# Patient Record
Sex: FEMALE | Race: BLACK OR AFRICAN AMERICAN | NOT HISPANIC OR LATINO | Employment: FULL TIME | URBAN - METROPOLITAN AREA
[De-identification: names, ages, dates, MRNs, and addresses within clinical notes are randomized per-mention and may not be internally consistent; named-entity substitution may affect disease eponyms.]

---

## 2017-04-26 ENCOUNTER — OFFICE VISIT (OUTPATIENT)
Dept: MEDICAL GROUP | Facility: PHYSICIAN GROUP | Age: 27
End: 2017-04-26
Payer: COMMERCIAL

## 2017-04-26 VITALS
OXYGEN SATURATION: 98 % | SYSTOLIC BLOOD PRESSURE: 110 MMHG | HEART RATE: 96 BPM | DIASTOLIC BLOOD PRESSURE: 60 MMHG | WEIGHT: 225.8 LBS | TEMPERATURE: 98.6 F | RESPIRATION RATE: 16 BRPM | BODY MASS INDEX: 35.44 KG/M2 | HEIGHT: 67 IN

## 2017-04-26 DIAGNOSIS — R53.81 CHRONIC FATIGUE AND MALAISE: ICD-10-CM

## 2017-04-26 DIAGNOSIS — J45.20 MILD INTERMITTENT ASTHMA WITHOUT COMPLICATION: ICD-10-CM

## 2017-04-26 DIAGNOSIS — R53.82 CHRONIC FATIGUE AND MALAISE: ICD-10-CM

## 2017-04-26 PROCEDURE — 99214 OFFICE O/P EST MOD 30 MIN: CPT | Performed by: FAMILY MEDICINE

## 2017-04-26 RX ORDER — ALBUTEROL SULFATE 90 UG/1
2 AEROSOL, METERED RESPIRATORY (INHALATION) EVERY 6 HOURS PRN
Qty: 8.5 G | Refills: 3 | Status: SHIPPED
Start: 2017-04-26 | End: 2019-12-29

## 2017-04-26 ASSESSMENT — ENCOUNTER SYMPTOMS
PALPITATIONS: 0
EYES NEGATIVE: 1
HEMOPTYSIS: 0
PSYCHIATRIC NEGATIVE: 1
CHILLS: 0
MYALGIAS: 0
CONSTIPATION: 0
NECK PAIN: 0
CARDIOVASCULAR NEGATIVE: 1
MUSCULOSKELETAL NEGATIVE: 1
GASTROINTESTINAL NEGATIVE: 1
HEADACHES: 0
CHEST TIGHTNESS: 1
COUGH: 1
NEUROLOGICAL NEGATIVE: 1
FEVER: 0
DIZZINESS: 0
FATIGUE: 1

## 2017-04-26 ASSESSMENT — PATIENT HEALTH QUESTIONNAIRE - PHQ9: CLINICAL INTERPRETATION OF PHQ2 SCORE: 2

## 2017-04-26 NOTE — PROGRESS NOTES
Subjective:      Joy Isbell is a 26 y.o. female who presents with Asthma; Fatigue; and Orders Needed            HPI Comments: Well controlled asthma but new issue of fatigue    1. Mild intermittent asthma without complication    - Mometasone Furo-Formoterol Fum (DULERA) 200-5 MCG/ACT Aerosol; Inhale 1 Puff by mouth 2 Times a Day.  Dispense: 1 Inhaler; Refill: 6  - albuterol 108 (90 BASE) MCG/ACT Aero Soln inhalation aerosol; Inhale 2 Puffs by mouth every 6 hours as needed for Shortness of Breath.  Dispense: 8.5 g; Refill: 3    Past Medical History:    Asthma                                                      History reviewed. No pertinent surgical history.    Smoking Status: Current Every Day Smoker        Packs/Day: 0.25  Years: 1.5        Types: Cigarettes    Smokeless Status: Never Used                        Alcohol Use: Yes                Comment: 3-4 beers on the weekends occasionly                 drinks wine    History reviewed.  No pertinent family history.      Current outpatient prescriptions: •  Mometasone Furo-Formoterol Fum (DULERA) 200-5 MCG/ACT Aerosol, Inhale 1 Puff by mouth 2 Times a Day., Disp: 1 Inhaler, Rfl: 6•  albuterol 108 (90 BASE) MCG/ACT Aero Soln inhalation aerosol, Inhale 2 Puffs by mouth every 6 hours as needed for Shortness of Breath., Disp: 8.5 g, Rfl: 3•  ipratropium-albuterol (COMBIVENT RESPIMAT)  MCG/ACT AERS, Inhale 1 Puff by mouth 4 times a day., Disp: 1 Inhaler, Rfl: 5•  fluticasone (FLONASE) 50 MCG/ACT nasal spray, Spray 1 Spray in nose every day., Disp: 16 g, Rfl: 03•  loratadine/pseudo SR (CLARITIN D) 5-120 MG TB12, Take 1 Tab by mouth 2 times a day., Disp: 60 Tab, Rfl: 3        Asthma  She complains of chest tightness and cough. There is no hemoptysis. This is a chronic problem. The current episode started more than 1 year ago. The problem occurs intermittently. The problem has been waxing and waning. Associated symptoms include malaise/fatigue. Pertinent  "negatives include no chest pain, fever, headaches or myalgias. Her past medical history is significant for asthma.   Fatigue  This is a chronic problem. The current episode started more than 1 month ago. The problem occurs constantly. The problem has been unchanged. Associated symptoms include coughing and fatigue. Pertinent negatives include no chest pain, chills, fever, headaches, myalgias, neck pain or rash. Associated symptoms comments: Hair loss. The symptoms are aggravated by exertion. She has tried rest for the symptoms. The treatment provided mild relief.       Review of Systems   Constitutional: Positive for malaise/fatigue and fatigue. Negative for fever and chills.        Past Medical History:    Asthma                                                      History reviewed. No pertinent surgical history.    Smoking Status: Current Every Day Smoker        Packs/Day: 0.25  Years: 1.5       Types: Cigarettes    Smokeless Status: Never Used                        Alcohol Use: Yes                Comment: 3-4 beers on the weekends occasionly                 drinks wine    History reviewed.  No pertinent family history.     HENT: Negative.    Eyes: Negative.    Respiratory: Positive for cough. Negative for hemoptysis.    Cardiovascular: Negative.  Negative for chest pain and palpitations.   Gastrointestinal: Negative.  Negative for constipation.   Genitourinary: Negative.  Negative for dysuria and urgency.   Musculoskeletal: Negative.  Negative for myalgias and neck pain.   Skin: Negative.  Negative for rash.   Neurological: Negative.  Negative for dizziness and headaches.   Endo/Heme/Allergies: Negative.    Psychiatric/Behavioral: Negative.  Negative for suicidal ideas.          Objective:     /60 mmHg  Pulse 96  Temp(Src) 37 °C (98.6 °F)  Resp 16  Ht 1.689 m (5' 6.5\")  Wt 102.422 kg (225 lb 12.8 oz)  BMI 35.90 kg/m2  SpO2 98%     Physical Exam   Constitutional: She is oriented to person, place, and " time. No distress.   HENT:   Head: Normocephalic and atraumatic.   Right Ear: External ear normal.   Left Ear: External ear normal.   Nose: Nose normal.   Mouth/Throat: Oropharynx is clear and moist. No oropharyngeal exudate.   Eyes: Pupils are equal, round, and reactive to light. Right eye exhibits no discharge. Left eye exhibits no discharge. No scleral icterus.   Neck: Normal range of motion. Neck supple. No JVD present. No tracheal deviation present. No thyromegaly present.   Cardiovascular: Normal rate, regular rhythm, normal heart sounds and intact distal pulses.  Exam reveals no gallop and no friction rub.    No murmur heard.  Pulmonary/Chest: Effort normal and breath sounds normal. No stridor. No respiratory distress. She has no wheezes. She has no rales. She exhibits no tenderness.   Abdominal: Soft. She exhibits no distension. There is no tenderness.   Lymphadenopathy:     She has no cervical adenopathy.   Neurological: She is alert and oriented to person, place, and time.   Skin: Skin is warm and dry. She is not diaphoretic.   Psychiatric: Judgment normal.   Nursing note and vitals reviewed.              Assessment/Plan:     1. Mild intermittent asthma without complication    - Mometasone Furo-Formoterol Fum (DULERA) 200-5 MCG/ACT Aerosol; Inhale 1 Puff by mouth 2 Times a Day.  Dispense: 1 Inhaler; Refill: 6  - albuterol 108 (90 BASE) MCG/ACT Aero Soln inhalation aerosol; Inhale 2 Puffs by mouth every 6 hours as needed for Shortness of Breath.  Dispense: 8.5 g; Refill: 3    2. Fatigue will get labs

## 2017-04-26 NOTE — MR AVS SNAPSHOT
"Joy Isbell   2017 8:00 AM   Office Visit   MRN: [id number]    Department:  [facility]    Dept Phone:  [phone]    Description:  Female : 1990   Provider:  Trenton Costa M.D.           Reason for Visit     Asthma     Fatigue     Orders Needed labs      Allergies as of 2017     No Known Allergies      You were diagnosed with     Mild intermittent asthma without complication   [009044]       Chronic fatigue and malaise   [043633]         Vital Signs     Blood Pressure Pulse Temperature Respirations Height Weight    110/60 mmHg 96 37 °C (98.6 °F) 16 1.689 m (5' 6.5\") 102.422 kg (225 lb 12.8 oz)    Body Mass Index Oxygen Saturation Smoking Status             35.90 kg/m2 98% Current Every Day Smoker         Basic Information     Date Of Birth Sex Race Ethnicity Preferred Language    1990 Female Black or  Non- English      Problem List              ICD-10-CM Priority Class Noted - Resolved    Mild intermittent asthma without complication J45.20   2017 - Present      Health Maintenance        Date Due Completion Dates    IMM VARICELLA (CHICKENPOX) VACCINE (2 of 2 - 2 Dose Childhood Series) 1998    IMM HPV VACCINE (1 of 3 - Female 3 Dose Series) 9/15/2001 ---    IMM DTaP/Tdap/Td Vaccine (6 - Tdap) 9/15/2001 1994, 1992, 3/19/1991, 1991, 1990    PAP SMEAR 9/15/2011 ---            Current Immunizations     Dtap Vaccine 1994, 1992, 3/19/1991, 1991, 1990    HIB Vaccine (ACTHIB/HIBERIX) 1992, 1991, 3/19/1991, 1991    Hepatitis A Vaccine, Ped/Adol 2008, 2003    Hepatitis B Vaccine Non-Recombivax (Ped/Adol) 2/3/2004, 2003, 2003    IPV 1992, 3/19/1991, 1991, 1990    MMR Vaccine 1994, 1/15/1992    Varicella Vaccine Live 1998      Below and/or attached are the medications your provider expects you to take. Review all of your home medications and newly " ordered medications with your provider and/or pharmacist. Follow medication instructions as directed by your provider and/or pharmacist. Please keep your medication list with you and share with your provider. Update the information when medications are discontinued, doses are changed, or new medications (including over-the-counter products) are added; and carry medication information at all times in the event of emergency situations     Allergies:  No Known Allergies          Medications  Valid as of: April 26, 2017 -  8:30 AM    Generic Name Brand Name Tablet Size Instructions for use    Albuterol Sulfate (Aero Soln) albuterol 108 (90 BASE) MCG/ACT Inhale 2 Puffs by mouth every 6 hours as needed for Shortness of Breath.        Fluticasone Propionate (Suspension) FLONASE 50 MCG/ACT Spray 1 Spray in nose every day.        Ipratropium-Albuterol (Aero Soln) COMBIVENT RESPIMAT  MCG/ACT Inhale 1 Puff by mouth 4 times a day.        Loratadine-Pseudoephedrine (TABLET SR 12 HR) CLARITIN D 5-120 MG Take 1 Tab by mouth 2 times a day.        Mometasone Furo-Formoterol Fum (Aerosol) Mometasone Furo-Formoterol Fum 200-5 MCG/ACT Inhale 1 Puff by mouth 2 Times a Day.        .                 Medicines prescribed today were sent to:     [facility] [address] [place] [address] (N), [address] (N) [place] [address]    Phone: [phone] Fax: [phone]    Open 24 Hours?: No      Medication refill instructions:       If your prescription bottle indicates you have medication refills left, it is not necessary to call your provider’s office. Please contact your pharmacy and they will refill your medication.    If your prescription bottle indicates you do not have any refills left, you may request refills at any time through one of the following ways: The online [employer] system (except Urgent Care), by calling your provider’s office, or by asking your pharmacy to contact your provider’s office  with a refill request. Medication refills are processed only during regular business hours and may not be available until the next business day. Your provider may request additional information or to have a follow-up visit with you prior to refilling your medication.   *Please Note: Medication refills are assigned a new Rx number when refilled electronically. Your pharmacy may indicate that no refills were authorized even though a new prescription for the same medication is available at the pharmacy. Please request the medicine by name with the pharmacy before contacting your provider for a refill.        Your To Do List     Future Labs/Procedures Complete By Expires    CBC WITHOUT DIFFERENTIAL  As directed 10/27/2017    COMP METABOLIC PANEL  As directed 4/26/2018    FREE THYROXINE  As directed 4/26/2018    IRON/TOTAL IRON BIND  As directed 10/27/2017    TRIIDOTHYRONINE  As directed 4/26/2018    TSH  As directed 4/26/2018    VITAMIN B12  As directed 10/26/2017    VITAMIN D,25 HYDROXY  As directed 4/26/2018         Gazemetrix Access Code: [email or web address]  Expires: 5/26/2017  8:30 AM    Gazemetrix  A secure, online tool to manage your health information     [employer]’s Gazemetrix® is a secure, online tool that connects you to your personalized health information from the privacy of your home -- day or night - making it very easy for you to manage your healthcare. Once the activation process is completed, you can even access your medical information using the Gazemetrix ronda, which is available for free in the Apple Ronda store or Google Play store.     Gazemetrix provides the following levels of access (as shown below):   My Chart Features   [facility] Doctor [facility]  Specialists [facility]  Urgent  Care [facility]  Primary Care  Doctor   Email your healthcare team securely and privately 24/7 X X X    Manage appointments: schedule your next appointment; view details of past/upcoming appointments X      Request prescription  refills. X      View recent personal medical records, including lab and immunizations X X X X   View health record, including health history, allergies, medications X X X X   Read reports about your outpatient visits, procedures, consult and ER notes X X X X   See your discharge summary, which is a recap of your hospital and/or ER visit that includes your diagnosis, lab results, and care plan. X X       How to register for InflowControl:  1. Go to  [email or web address].  2. Click on the Sign Up Now box, which takes you to the New Member Sign Up page. You will need to provide the following information:  a. Enter your InflowControl Access Code exactly as it appears at the top of this page. (You will not need to use this code after you’ve completed the sign-up process. If you do not sign up before the expiration date, you must request a new code.)   b. Enter your date of birth.   c. Enter your home email address.   d. Click Submit, and follow the next screen’s instructions.  3. Create a InflowControl ID. This will be your InflowControl login ID and cannot be changed, so think of one that is secure and easy to remember.  4. Create a InflowControl password. You can change your password at any time.  5. Enter your Password Reset Question and Answer. This can be used at a later time if you forget your password.   6. Enter your e-mail address. This allows you to receive e-mail notifications when new information is available in InflowControl.  7. Click Sign Up. You can now view your health information.    For assistance activating your InflowControl account, call [phone]        Quit Tobacco Information     Do you want to quit using tobacco?    Quitting tobacco decreases risks of cancer, heart and lung disease, increases life expectancy, improves sense of taste and smell, and increases spending money, among other benefits.    If you are thinking about quitting, we can help.  • [facility] Quit Tobacco Program: [phone]  o Program occurs weekly for four  weeks and includes pharmacist consultation on products to support quitting smoking or chewing tobacco. A provider referral is needed for pharmacist consultation.  • Tobacco Users Help Hotline: 5-032-QUIT-NOW (938-7479) or https://[place].quitlogix.org/  o Free, confidential telephone and online coaching for [place] residents. Sessions are designed on a schedule that is convenient for you. Eligible clients receive free nicotine replacement therapy.  • Nationally: www.smokefree.gov  o Information and professional assistance to support both immediate and long-term needs as you become, and remain, a non-smoker. Smokefree.gov allows you to choose the help that best fits your needs.

## 2017-04-27 ENCOUNTER — HOSPITAL ENCOUNTER (OUTPATIENT)
Dept: LAB | Facility: MEDICAL CENTER | Age: 27
End: 2017-04-27
Attending: FAMILY MEDICINE
Payer: COMMERCIAL

## 2017-04-27 DIAGNOSIS — R53.82 CHRONIC FATIGUE AND MALAISE: ICD-10-CM

## 2017-04-27 DIAGNOSIS — R53.81 CHRONIC FATIGUE AND MALAISE: ICD-10-CM

## 2017-04-27 DIAGNOSIS — J45.20 MILD INTERMITTENT ASTHMA WITHOUT COMPLICATION: ICD-10-CM

## 2017-04-27 LAB
25(OH)D3 SERPL-MCNC: 15 NG/ML (ref 30–100)
ALBUMIN SERPL BCP-MCNC: 4.3 G/DL (ref 3.2–4.9)
ALBUMIN/GLOB SERPL: 1.3 G/DL
ALP SERPL-CCNC: 97 U/L (ref 30–99)
ALT SERPL-CCNC: 20 U/L (ref 2–50)
ANION GAP SERPL CALC-SCNC: 9 MMOL/L (ref 0–11.9)
AST SERPL-CCNC: 19 U/L (ref 12–45)
BILIRUB SERPL-MCNC: 0.5 MG/DL (ref 0.1–1.5)
BUN SERPL-MCNC: 12 MG/DL (ref 8–22)
CALCIUM SERPL-MCNC: 9.7 MG/DL (ref 8.5–10.5)
CHLORIDE SERPL-SCNC: 105 MMOL/L (ref 96–112)
CO2 SERPL-SCNC: 25 MMOL/L (ref 20–33)
CREAT SERPL-MCNC: 0.84 MG/DL (ref 0.5–1.4)
ERYTHROCYTE [DISTWIDTH] IN BLOOD BY AUTOMATED COUNT: 44.7 FL (ref 35.9–50)
GFR SERPL CREATININE-BSD FRML MDRD: >60 ML/MIN/1.73 M 2
GLOBULIN SER CALC-MCNC: 3.3 G/DL (ref 1.9–3.5)
GLUCOSE SERPL-MCNC: 77 MG/DL (ref 65–99)
HCT VFR BLD AUTO: 44.1 % (ref 37–47)
HGB BLD-MCNC: 13.3 G/DL (ref 12–16)
IRON SATN MFR SERPL: 19 % (ref 15–55)
IRON SERPL-MCNC: 73 UG/DL (ref 40–170)
MCH RBC QN AUTO: 26 PG (ref 27–33)
MCHC RBC AUTO-ENTMCNC: 30.2 G/DL (ref 33.6–35)
MCV RBC AUTO: 86.1 FL (ref 81.4–97.8)
PLATELET # BLD AUTO: 377 K/UL (ref 164–446)
PMV BLD AUTO: 9.8 FL (ref 9–12.9)
POTASSIUM SERPL-SCNC: 4.2 MMOL/L (ref 3.6–5.5)
PROT SERPL-MCNC: 7.6 G/DL (ref 6–8.2)
RBC # BLD AUTO: 5.12 M/UL (ref 4.2–5.4)
SODIUM SERPL-SCNC: 139 MMOL/L (ref 135–145)
T3 SERPL-MCNC: 133.1 NG/DL (ref 60–181)
T4 FREE SERPL-MCNC: 0.75 NG/DL (ref 0.53–1.43)
TIBC SERPL-MCNC: 384 UG/DL (ref 250–450)
TSH SERPL DL<=0.005 MIU/L-ACNC: 1.16 UIU/ML (ref 0.3–3.7)
VIT B12 SERPL-MCNC: 176 PG/ML (ref 211–911)
WBC # BLD AUTO: 7.6 K/UL (ref 4.8–10.8)

## 2017-04-27 PROCEDURE — 83540 ASSAY OF IRON: CPT

## 2017-04-27 PROCEDURE — 84443 ASSAY THYROID STIM HORMONE: CPT

## 2017-04-27 PROCEDURE — 36415 COLL VENOUS BLD VENIPUNCTURE: CPT

## 2017-04-27 PROCEDURE — 82607 VITAMIN B-12: CPT

## 2017-04-27 PROCEDURE — 80053 COMPREHEN METABOLIC PANEL: CPT

## 2017-04-27 PROCEDURE — 83550 IRON BINDING TEST: CPT

## 2017-04-27 PROCEDURE — 82306 VITAMIN D 25 HYDROXY: CPT

## 2017-04-27 PROCEDURE — 84480 ASSAY TRIIODOTHYRONINE (T3): CPT

## 2017-04-27 PROCEDURE — 85027 COMPLETE CBC AUTOMATED: CPT

## 2017-04-27 PROCEDURE — 84439 ASSAY OF FREE THYROXINE: CPT

## 2017-05-01 ENCOUNTER — TELEPHONE (OUTPATIENT)
Dept: MEDICAL GROUP | Facility: PHYSICIAN GROUP | Age: 27
End: 2017-05-01

## 2017-05-01 NOTE — TELEPHONE ENCOUNTER
----- Message from Trenton Costa M.D. sent at 5/1/2017  9:30 AM PDT -----  This patient needs an appointment within the next week. Please schedule this with the patient so we may discuss their lab results

## 2017-05-11 ENCOUNTER — OFFICE VISIT (OUTPATIENT)
Dept: MEDICAL GROUP | Facility: PHYSICIAN GROUP | Age: 27
End: 2017-05-11
Payer: COMMERCIAL

## 2017-05-11 VITALS
HEART RATE: 80 BPM | BODY MASS INDEX: 36.64 KG/M2 | TEMPERATURE: 99 F | HEIGHT: 66 IN | SYSTOLIC BLOOD PRESSURE: 122 MMHG | OXYGEN SATURATION: 98 % | DIASTOLIC BLOOD PRESSURE: 62 MMHG | WEIGHT: 228 LBS | RESPIRATION RATE: 16 BRPM

## 2017-05-11 DIAGNOSIS — E55.9 VITAMIN D DEFICIENCY: ICD-10-CM

## 2017-05-11 DIAGNOSIS — E53.8 VITAMIN B12 DEFICIENCY: ICD-10-CM

## 2017-05-11 DIAGNOSIS — H11.89 CONJUNCTIVAL IRRITATION: ICD-10-CM

## 2017-05-11 PROCEDURE — 99214 OFFICE O/P EST MOD 30 MIN: CPT | Performed by: FAMILY MEDICINE

## 2017-05-11 RX ORDER — POLYMYXIN B SULFATE AND TRIMETHOPRIM 1; 10000 MG/ML; [USP'U]/ML
1 SOLUTION OPHTHALMIC EVERY 4 HOURS
Qty: 10 ML | Refills: 0 | Status: SHIPPED | OUTPATIENT
Start: 2017-05-11 | End: 2017-09-12

## 2017-05-11 ASSESSMENT — ENCOUNTER SYMPTOMS
HEMOPTYSIS: 0
COUGH: 0
MYALGIAS: 0
CARDIOVASCULAR NEGATIVE: 1
MUSCULOSKELETAL NEGATIVE: 1
NEUROLOGICAL NEGATIVE: 1
DIZZINESS: 0
PSYCHIATRIC NEGATIVE: 1
NECK PAIN: 0
CONSTITUTIONAL NEGATIVE: 1
FEVER: 0
EYE REDNESS: 1
GASTROINTESTINAL NEGATIVE: 1
RESPIRATORY NEGATIVE: 1
CHILLS: 0
PALPITATIONS: 0
CONSTIPATION: 0
EYE DISCHARGE: 1
HEADACHES: 0

## 2017-05-11 NOTE — MR AVS SNAPSHOT
"Joy Isbell   2017 11:30 AM   Office Visit   MRN: [id number]    Department:  [facility]    Dept Phone:  [phone]    Description:  Female : 1990   Provider:  Trenton Costa M.D.           Reason for Visit     Results Labs    Eye Problem           Allergies as of 2017     No Known Allergies      You were diagnosed with     Conjunctival irritation   [143386]       Vitamin D deficiency   [4341490]       Vitamin B12 deficiency   [019852]         Vital Signs     Blood Pressure Pulse Temperature Respirations Height Weight    122/62 mmHg 80 37.2 °C (99 °F) 16 1.689 m (5' 6.5\") 103.42 kg (228 lb)    Body Mass Index Oxygen Saturation Smoking Status             36.25 kg/m2 98% Current Every Day Smoker         Basic Information     Date Of Birth Sex Race Ethnicity Preferred Language    1990 Female Black or  Non- English      Problem List              ICD-10-CM Priority Class Noted - Resolved    Mild intermittent asthma without complication J45.20   2017 - Present      Health Maintenance        Date Due Completion Dates    IMM VARICELLA (CHICKENPOX) VACCINE (2 of 2 - 2 Dose Childhood Series) 1998    IMM HPV VACCINE (1 of 3 - Female 3 Dose Series) 9/15/2001 ---    IMM DTaP/Tdap/Td Vaccine (6 - Tdap) 9/15/2001 1994, 1992, 3/19/1991, 1991, 1990    IMM PNEUMOCOCCAL 19-64 (ADULT) MEDIUM RISK SERIES (1 of 1 - PPSV23) 9/15/2009 ---    PAP SMEAR 9/15/2011 ---            Current Immunizations     Dtap Vaccine 1994, 1992, 3/19/1991, 1991, 1990    HIB Vaccine (ACTHIB/HIBERIX) 1992, 1991, 3/19/1991, 1991    Hepatitis A Vaccine, Ped/Adol 2008, 2003    Hepatitis B Vaccine Non-Recombivax (Ped/Adol) 2/3/2004, 2003, 2003    IPV 1992, 3/19/1991, 1991, 1990    MMR Vaccine 1994, 1/15/1992    Varicella Vaccine Live 1998      Below and/or attached are the medications " your provider expects you to take. Review all of your home medications and newly ordered medications with your provider and/or pharmacist. Follow medication instructions as directed by your provider and/or pharmacist. Please keep your medication list with you and share with your provider. Update the information when medications are discontinued, doses are changed, or new medications (including over-the-counter products) are added; and carry medication information at all times in the event of emergency situations     Allergies:  No Known Allergies          Medications  Valid as of: May 11, 2017 - 11:37 AM    Generic Name Brand Name Tablet Size Instructions for use    Albuterol Sulfate (Aero Soln) albuterol 108 (90 BASE) MCG/ACT Inhale 2 Puffs by mouth every 6 hours as needed for Shortness of Breath.        Fluticasone Propionate (Suspension) FLONASE 50 MCG/ACT Spray 1 Spray in nose every day.        Ipratropium-Albuterol (Aero Soln) COMBIVENT RESPIMAT  MCG/ACT Inhale 1 Puff by mouth 4 times a day.        Loratadine-Pseudoephedrine (TABLET SR 12 HR) CLARITIN D 5-120 MG Take 1 Tab by mouth 2 times a day.        Mometasone Furo-Formoterol Fum (Aerosol) Mometasone Furo-Formoterol Fum 200-5 MCG/ACT Inhale 1 Puff by mouth 2 Times a Day.        Polymyxin B-Trimethoprim (Solution) POLYTRIM 68866-4.1 UNIT/ML-% Place 1 Drop in both eyes every 4 hours.        .                 Medicines prescribed today were sent to:     [address] ([address]), [place] - [address] [place] [address]    [address] [place] [address] (N) [place] [address]    Phone: [phone] Fax: [phone]    Open 24 Hours?: No      Medication refill instructions:       If your prescription bottle indicates you have medication refills left, it is not necessary to call your provider’s office. Please contact your pharmacy and they will refill your medication.    If your prescription bottle indicates you do not have any refills left, you may request refills  at any time through one of the following ways: The online PeopleDoc system (except Urgent Care), by calling your provider’s office, or by asking your pharmacy to contact your provider’s office with a refill request. Medication refills are processed only during regular business hours and may not be available until the next business day. Your provider may request additional information or to have a follow-up visit with you prior to refilling your medication.   *Please Note: Medication refills are assigned a new Rx number when refilled electronically. Your pharmacy may indicate that no refills were authorized even though a new prescription for the same medication is available at the pharmacy. Please request the medicine by name with the pharmacy before contacting your provider for a refill.           PeopleDoc Access Code: Activation code not generated  Current PeopleDoc Status: Active          Quit Tobacco Information     Do you want to quit using tobacco?    Quitting tobacco decreases risks of cancer, heart and lung disease, increases life expectancy, improves sense of taste and smell, and increases spending money, among other benefits.    If you are thinking about quitting, we can help.  • [employer] Quit Tobacco Program: 403.886.7790  o Program occurs weekly for four weeks and includes pharmacist consultation on products to support quitting smoking or chewing tobacco. A provider referral is needed for pharmacist consultation.  • Tobacco Users Help Hotline: 1-504-QUITNOW (374-1952) or https://[place].quitlogix.org/  o Free, confidential telephone and online coaching for [place] residents. Sessions are designed on a schedule that is convenient for you. Eligible clients receive free nicotine replacement therapy.  • Nationally: www.smokefree.gov  o Information and professional assistance to support both immediate and long-term needs as you become, and remain, a non-smoker. Smokefree.gov allows you to choose the help that best  fits your needs.

## 2017-05-11 NOTE — PROGRESS NOTES
Subjective:      Joy Isbell is a 26 y.o. female who presents with Results and Eye Problem            HPI Comments: 1. Conjunctival irritation  Red watery eyes for 1 d, no other sx, no pall/prov  - polymixin-trimethoprim (POLYTRIM) 34665-7.1 UNIT/ML-% Solution; Place 1 Drop in both eyes every 4 hours.  Dispense: 10 mL; Refill: 0    2. Vitamin D deficiency  Low on labs will replace otc    3. Vitamin B12 deficiency  Low on labs will replace otc    Past Medical History:    Asthma                                                      History reviewed. No pertinent surgical history.    Smoking Status: Current Every Day Smoker        Packs/Day: 0.25  Years: 1.5        Types: Cigarettes    Smokeless Status: Never Used                        Alcohol Use: Yes                Comment: 3-4 beers on the weekends occasionly                 drinks wine    History reviewed.  No pertinent family history.      Current outpatient prescriptions: •  polymixin-trimethoprim (POLYTRIM) 89015-4.1 UNIT/ML-% Solution, Place 1 Drop in both eyes every 4 hours., Disp: 10 mL, Rfl: 0•  Mometasone Furo-Formoterol Fum (DULERA) 200-5 MCG/ACT Aerosol, Inhale 1 Puff by mouth 2 Times a Day., Disp: 1 Inhaler, Rfl: 6•  albuterol 108 (90 BASE) MCG/ACT Aero Soln inhalation aerosol, Inhale 2 Puffs by mouth every 6 hours as needed for Shortness of Breath., Disp: 8.5 g, Rfl: 3•  ipratropium-albuterol (COMBIVENT RESPIMAT)  MCG/ACT AERS, Inhale 1 Puff by mouth 4 times a day., Disp: 1 Inhaler, Rfl: 5•  fluticasone (FLONASE) 50 MCG/ACT nasal spray, Spray 1 Spray in nose every day., Disp: 16 g, Rfl: 03•  loratadine/pseudo SR (CLARITIN D) 5-120 MG TB12, Take 1 Tab by mouth 2 times a day., Disp: 60 Tab, Rfl: 3        Eye Problem   Associated symptoms include an eye discharge and eye redness. Pertinent negatives include no fever.       Review of Systems   Constitutional: Negative.  Negative for fever and chills.        Past Medical History:    Asthma             "                                          History reviewed. No pertinent surgical history.    Smoking Status: Current Every Day Smoker        Packs/Day: 0.25  Years: 1.5       Types: Cigarettes    Smokeless Status: Never Used                        Alcohol Use: Yes                Comment: 3-4 beers on the weekends occasionly                 drinks wine    History reviewed.  No pertinent family history.     HENT: Negative.    Eyes: Positive for discharge and redness.   Respiratory: Negative.  Negative for cough and hemoptysis.    Cardiovascular: Negative.  Negative for chest pain and palpitations.   Gastrointestinal: Negative.  Negative for constipation.   Genitourinary: Negative.  Negative for dysuria and urgency.   Musculoskeletal: Negative.  Negative for myalgias and neck pain.   Skin: Negative.  Negative for rash.   Neurological: Negative.  Negative for dizziness and headaches.   Endo/Heme/Allergies: Negative.    Psychiatric/Behavioral: Negative.  Negative for suicidal ideas.          Objective:     /62 mmHg  Pulse 80  Temp(Src) 37.2 °C (99 °F)  Resp 16  Ht 1.689 m (5' 6.5\")  Wt 103.42 kg (228 lb)  BMI 36.25 kg/m2  SpO2 98%     Physical Exam   Constitutional: She is oriented to person, place, and time. No distress.   HENT:   Head: Normocephalic and atraumatic.   Right Ear: External ear normal.   Left Ear: External ear normal.   Nose: Nose normal.   Mouth/Throat: Oropharynx is clear and moist. No oropharyngeal exudate.   Eyes: Pupils are equal, round, and reactive to light. Right eye exhibits no discharge. Left eye exhibits no discharge. Right conjunctiva is injected. Left conjunctiva is injected. No scleral icterus.   Neck: Normal range of motion. Neck supple. No JVD present. No tracheal deviation present. No thyromegaly present.   Cardiovascular: Normal rate, regular rhythm, normal heart sounds and intact distal pulses.  Exam reveals no gallop and no friction rub.    No murmur " heard.  Pulmonary/Chest: Effort normal and breath sounds normal. No stridor. No respiratory distress. She has no wheezes. She has no rales. She exhibits no tenderness.   Abdominal: Soft. She exhibits no distension. There is no tenderness.   Lymphadenopathy:     She has no cervical adenopathy.   Neurological: She is alert and oriented to person, place, and time.   Skin: Skin is warm and dry. She is not diaphoretic.   Psychiatric: Judgment normal.   Nursing note and vitals reviewed.              Assessment/Plan:     1. Conjunctival irritation    - polymixin-trimethoprim (POLYTRIM) 97900-0.1 UNIT/ML-% Solution; Place 1 Drop in both eyes every 4 hours.  Dispense: 10 mL; Refill: 0    2. Vitamin D deficiency      3. Vitamin B12 deficiency

## 2017-09-12 ENCOUNTER — OFFICE VISIT (OUTPATIENT)
Dept: MEDICAL GROUP | Facility: PHYSICIAN GROUP | Age: 27
End: 2017-09-12
Payer: COMMERCIAL

## 2017-09-12 VITALS
RESPIRATION RATE: 16 BRPM | DIASTOLIC BLOOD PRESSURE: 68 MMHG | HEIGHT: 67 IN | BODY MASS INDEX: 34.47 KG/M2 | WEIGHT: 219.6 LBS | HEART RATE: 86 BPM | SYSTOLIC BLOOD PRESSURE: 110 MMHG | TEMPERATURE: 98.6 F | OXYGEN SATURATION: 97 %

## 2017-09-12 DIAGNOSIS — F17.210 NICOTINE DEPENDENCE, CIGARETTES, UNCOMPLICATED: ICD-10-CM

## 2017-09-12 DIAGNOSIS — J45.20 MILD INTERMITTENT ASTHMA WITHOUT COMPLICATION: ICD-10-CM

## 2017-09-12 DIAGNOSIS — R09.81 SINUS CONGESTION: ICD-10-CM

## 2017-09-12 PROBLEM — E66.9 OBESITY (BMI 30-39.9): Status: ACTIVE | Noted: 2017-09-12

## 2017-09-12 PROBLEM — Z72.0 TOBACCO ABUSE: Status: ACTIVE | Noted: 2017-09-12

## 2017-09-12 PROCEDURE — 99214 OFFICE O/P EST MOD 30 MIN: CPT | Mod: 25 | Performed by: NURSE PRACTITIONER

## 2017-09-12 PROCEDURE — 99406 BEHAV CHNG SMOKING 3-10 MIN: CPT | Performed by: NURSE PRACTITIONER

## 2017-09-12 RX ORDER — AMOXICILLIN AND CLAVULANATE POTASSIUM 875; 125 MG/1; MG/1
1 TABLET, FILM COATED ORAL 2 TIMES DAILY
Qty: 20 TAB | Refills: 0 | Status: SHIPPED | OUTPATIENT
Start: 2017-09-12 | End: 2017-09-22

## 2017-09-12 ASSESSMENT — ENCOUNTER SYMPTOMS
MYALGIAS: 0
WEAKNESS: 0
BLURRED VISION: 0
WEIGHT LOSS: 0
SINUS PRESSURE: 1
COUGH: 1
BLOOD IN STOOL: 0
WHEEZING: 0
SORE THROAT: 0
VOMITING: 0
SWOLLEN GLANDS: 0
DIZZINESS: 0
SHORTNESS OF BREATH: 0
ABDOMINAL PAIN: 1
PHOTOPHOBIA: 0
HEADACHES: 1
EYE PAIN: 0
EYE DISCHARGE: 0
NAUSEA: 1
CHILLS: 0
EYE REDNESS: 0
FEVER: 0
SPUTUM PRODUCTION: 1
NECK PAIN: 0
DIAPHORESIS: 1
DIARRHEA: 1

## 2017-09-12 ASSESSMENT — PAIN SCALES - GENERAL: PAINLEVEL: 6=MODERATE PAIN

## 2017-09-12 NOTE — ASSESSMENT & PLAN NOTE
She currently smokes about 5 cigarettes/day and has been smoking for the past 1.5 years. Reports she is ready to quit, but she is having difficulty due to  also smoking. She is not interested in smoking cessation aids today.

## 2017-09-12 NOTE — PROGRESS NOTES
Subjective:      Joy Isbell is a 26 y.o. female who presents with Cough; Sinusitis; and Ear Fullness          Sinus Problem   This is a new problem. Episode onset: 2 weeks ago. The problem has been gradually worsening (sinus pressure/headache and congestion) since onset. There has been no fever. Her pain is at a severity of 6/10. The pain is moderate. Associated symptoms include congestion, coughing (dry cough), diaphoresis, headaches and sinus pressure. Pertinent negatives include no chills, ear pain (Fullness), neck pain, shortness of breath, sneezing, sore throat or swollen glands. Past treatments include acetaminophen. The treatment provided mild relief.   No history of chronic sinus problems or sinus surgeries. She was last treated with antibiotics over 1 year ago. No known allergies.     Mild intermittent asthma without complication  She has mild intermittent asthma which is currently controlled with Dulera 200-5 mcg 1 puff BID. Reports occasional cough over the past week due to acute illness. Denies SOB, chest tightness, or wheezing. She hasn't been using rescue inhaler.    Tobacco abuse  She currently smokes about 5 cigarettes/day and has been smoking for the past 1.5 years. Reports she is ready to quit, but she is having difficulty due to  also smoking. She is not interested in smoking cessation aids today.      Review of Systems   Constitutional: Positive for diaphoresis. Negative for chills, fever, malaise/fatigue and weight loss.   HENT: Positive for congestion and sinus pressure. Negative for ear discharge, ear pain (Fullness), hearing loss, sneezing, sore throat and tinnitus.         L ear fullness     Eyes: Negative for blurred vision, photophobia, pain, discharge and redness.   Respiratory: Positive for cough (dry cough) and sputum production. Negative for shortness of breath and wheezing.    Cardiovascular: Negative for chest pain.   Gastrointestinal: Positive for abdominal pain (Describes  "as gas pain), diarrhea (Intermittent since friday) and nausea. Negative for blood in stool and vomiting.   Genitourinary: Negative.    Musculoskeletal: Negative for myalgias and neck pain.   Skin: Negative for rash.   Neurological: Positive for headaches. Negative for dizziness and weakness.     Current Outpatient Prescriptions on File Prior to Visit   Medication Sig Dispense Refill   • Mometasone Furo-Formoterol Fum (DULERA) 200-5 MCG/ACT Aerosol Inhale 1 Puff by mouth 2 Times a Day. 1 Inhaler 6   • loratadine/pseudo SR (CLARITIN D) 5-120 MG TB12 Take 1 Tab by mouth 2 times a day. 60 Tab 3   • albuterol 108 (90 BASE) MCG/ACT Aero Soln inhalation aerosol Inhale 2 Puffs by mouth every 6 hours as needed for Shortness of Breath. 8.5 g 3   • fluticasone (FLONASE) 50 MCG/ACT nasal spray Spray 1 Spray in nose every day. 16 g 03     No current facility-administered medications on file prior to visit.      Past Medical History:   Diagnosis Date   • Asthma      Social History     Social History   • Marital status: Single     Spouse name: N/A   • Number of children: N/A   • Years of education: N/A     Occupational History   • Not on file.     Social History Main Topics   • Smoking status: Current Every Day Smoker     Packs/day: 0.25     Years: 1.50     Types: Cigarettes   • Smokeless tobacco: Never Used   • Alcohol use Yes      Comment: 3-4 beers on the weekends occasionly drinks wine   • Drug use: No   • Sexual activity: Yes     Partners: Male        Objective:     /68   Pulse 86   Temp 37 °C (98.6 °F)   Resp 16   Ht 1.689 m (5' 6.5\")   Wt 99.6 kg (219 lb 9.6 oz)   LMP 08/25/2017   SpO2 97%   Breastfeeding? No   BMI 34.91 kg/m²      Physical Exam   Constitutional: She is oriented to person, place, and time. She appears well-developed and well-nourished. No distress.   HENT:   Head: Normocephalic and atraumatic.   Right Ear: Tympanic membrane, external ear and ear canal normal. No drainage or swelling.   Left " Ear: External ear and ear canal normal. No drainage or swelling. Left ear bulging TM: serous fluid.   Nose: Mucosal edema, rhinorrhea and sinus tenderness present. Right sinus exhibits frontal sinus tenderness. Left sinus exhibits frontal sinus tenderness.   Mouth/Throat: Oropharynx is clear and moist and mucous membranes are normal. No tonsillar exudate.   Eyes: Conjunctivae are normal. Pupils are equal, round, and reactive to light.   Neck: Normal range of motion. Neck supple.   Cardiovascular: Normal rate, regular rhythm and normal heart sounds.  Exam reveals no friction rub.    No murmur heard.  Pulmonary/Chest: Effort normal. She has wheezes (intermittent inspiratory ). She exhibits no tenderness.   Abdominal: Soft. Bowel sounds are normal. She exhibits no distension and no mass. There is no tenderness.   Lymphadenopathy:     She has no cervical adenopathy.   Neurological: She is alert and oriented to person, place, and time.   Skin: Skin is warm and dry. No rash noted. No erythema.   Psychiatric: She has a normal mood and affect. Her behavior is normal. Thought content normal.         Assessment/Plan:     1. Sinus congestion  Antibiotic prescribed. Advised to finish full course of treatment. Recommend probiotic to replenish normal ishaan, such as yogurt, kombucha, or OTC capsule. Recommend using Flonase as needed. Can take Ibuprofen or Tylenol for pain. Recommend using cool-mist humidifier at night. RTC if symptoms worsen or don't improve.  - amoxicillin-clavulanate (AUGMENTIN) 875-125 MG Tab; Take 1 Tab by mouth 2 times a day for 10 days.  Dispense: 20 Tab; Refill: 0    2. Mild intermittent asthma without complication  Continue inhalers as prescribed. Encouraged to use rescue inhaler with symptoms such as dry cough, wheezing, or SOB. Recommend smoking cessation.    3. Nicotine dependence, cigarettes, uncomplicated  Spent >3 minutes providing tobacco cessation counseling. Patient is ready to quit but not  interested in any medication aid today. She is planning to make attempts to cut back.     Return in about 1 month (around 10/12/2017), or if symptoms worsen or fail to improve, for Annual exam.

## 2017-09-12 NOTE — ASSESSMENT & PLAN NOTE
She has mild intermittent asthma which is currently controlled with Dulera 200-5 mcg 1 puff BID. Reports occasional cough over the past week due to acute illness. Denies SOB, chest tightness, or wheezing. She hasn't been using rescue inhaler.

## 2017-10-09 ENCOUNTER — APPOINTMENT (OUTPATIENT)
Dept: SOCIAL WORK | Facility: CLINIC | Age: 27
End: 2017-10-09
Payer: COMMERCIAL

## 2017-10-09 PROCEDURE — 90471 IMMUNIZATION ADMIN: CPT | Performed by: REGISTERED NURSE

## 2017-10-09 PROCEDURE — 90686 IIV4 VACC NO PRSV 0.5 ML IM: CPT | Performed by: REGISTERED NURSE

## 2018-04-24 ENCOUNTER — OFFICE VISIT (OUTPATIENT)
Dept: MEDICAL GROUP | Facility: PHYSICIAN GROUP | Age: 28
End: 2018-04-24
Payer: COMMERCIAL

## 2018-04-24 VITALS
WEIGHT: 208 LBS | TEMPERATURE: 98.1 F | DIASTOLIC BLOOD PRESSURE: 62 MMHG | RESPIRATION RATE: 19 BRPM | HEART RATE: 100 BPM | SYSTOLIC BLOOD PRESSURE: 128 MMHG | OXYGEN SATURATION: 98 % | HEIGHT: 67 IN | BODY MASS INDEX: 32.65 KG/M2

## 2018-04-24 DIAGNOSIS — E66.9 OBESITY (BMI 30-39.9): ICD-10-CM

## 2018-04-24 DIAGNOSIS — Z72.0 TOBACCO ABUSE: ICD-10-CM

## 2018-04-24 DIAGNOSIS — J30.2 ACUTE SEASONAL ALLERGIC RHINITIS, UNSPECIFIED TRIGGER: ICD-10-CM

## 2018-04-24 PROCEDURE — 99214 OFFICE O/P EST MOD 30 MIN: CPT | Performed by: FAMILY MEDICINE

## 2018-04-24 RX ORDER — LORATADINE 10 MG/1
10 TABLET ORAL DAILY
Qty: 30 TAB | Refills: 5 | Status: SHIPPED | OUTPATIENT
Start: 2018-04-24 | End: 2019-12-29

## 2018-04-24 RX ORDER — TRIAMCINOLONE ACETONIDE 40 MG/ML
40 INJECTION, SUSPENSION INTRA-ARTICULAR; INTRAMUSCULAR ONCE
Status: COMPLETED | OUTPATIENT
Start: 2018-04-24 | End: 2018-04-24

## 2018-04-24 RX ADMIN — TRIAMCINOLONE ACETONIDE 40 MG: 40 INJECTION, SUSPENSION INTRA-ARTICULAR; INTRAMUSCULAR at 09:38

## 2018-04-24 ASSESSMENT — PATIENT HEALTH QUESTIONNAIRE - PHQ9: CLINICAL INTERPRETATION OF PHQ2 SCORE: 0

## 2018-04-24 NOTE — ASSESSMENT & PLAN NOTE
Patient reports having nasal congestion postnasal drip coughing up thick phlegm particularly in the morning. Does not tolerate Flonase or anything else squirted into the nose. Symptoms started approximately a week ago. Having some cough. No fever or chills. Needing inhaler more frequently.

## 2018-04-24 NOTE — ASSESSMENT & PLAN NOTE
Would like to quit smoking for her child's sake. Has been able to quit cold turkey in the past. Currently down from half a pack a day to 5 or 6 cigarettes a day. Does not think she is able to manage quitting on her on due to current life stressors. Like to consider Chantix.

## 2018-04-24 NOTE — PROGRESS NOTES
Complaint: Seasonal allergy symptoms, cough.     Subjective:     Joy Isbell is a 27 y.o. female here today To discuss her current symptoms as well as wanting to quit smoking.    Acute seasonal allergic rhinitis  Patient reports having nasal congestion postnasal drip coughing up thick phlegm particularly in the morning. Does not tolerate Flonase or anything else squirted into the nose. Symptoms started approximately a week ago. Having some cough. No fever or chills. Needing inhaler more frequently.    Tobacco abuse  Would like to quit smoking for her child's sake. Has been able to quit cold turkey in the past. Currently down from half a pack a day to 5 or 6 cigarettes a day. Does not think she is able to manage quitting on her on due to current life stressors. Like to consider Chantix.       Current medicines (including changes today)  Current Outpatient Prescriptions   Medication Sig Dispense Refill   • loratadine (CLARITIN) 10 MG Tab Take 1 Tab by mouth every day. 30 Tab 5   • varenicline (CHANTIX STARTING MONTH MARK) 0.5 MG X 11 & 1 MG X 42 tablet Take as directed daily 56 Tab 0   • Mometasone Furo-Formoterol Fum (DULERA) 200-5 MCG/ACT Aerosol Inhale 1 Puff by mouth 2 Times a Day. 1 Inhaler 6   • albuterol 108 (90 BASE) MCG/ACT Aero Soln inhalation aerosol Inhale 2 Puffs by mouth every 6 hours as needed for Shortness of Breath. 8.5 g 3     Current Facility-Administered Medications   Medication Dose Route Frequency Provider Last Rate Last Dose   • triamcinolone acetonide (KENALOG-40) injection 40 mg  40 mg Intramuscular Once Mathieu Gates M.D.         She  has a past medical history of Asthma.    Health Maintenance: Completed      Allergies: Patient has no known allergies.    Current Outpatient Prescriptions Ordered in [facility]   Medication Sig Dispense Refill   • loratadine (CLARITIN) 10 MG Tab Take 1 Tab by mouth every day. 30 Tab 5   • varenicline (CHANTIX STARTING MONTH MARK) 0.5 MG X 11 & 1 MG X 42 tablet  "Take as directed daily 56 Tab 0   • Mometasone Furo-Formoterol Fum (DULERA) 200-5 MCG/ACT Aerosol Inhale 1 Puff by mouth 2 Times a Day. 1 Inhaler 6   • albuterol 108 (90 BASE) MCG/ACT Aero Soln inhalation aerosol Inhale 2 Puffs by mouth every 6 hours as needed for Shortness of Breath. 8.5 g 3     Current Facility-Administered Medications Ordered in [facility]   Medication Dose Route Frequency Provider Last Rate Last Dose   • triamcinolone acetonide (KENALOG-40) injection 40 mg  40 mg Intramuscular Once Mathieu Gates M.D.           Past Medical History:   Diagnosis Date   • Asthma        History reviewed. No pertinent surgical history.    Social History   Substance Use Topics   • Smoking status: Current Every Day Smoker     Packs/day: 0.25     Years: 5.00     Types: Cigarettes   • Smokeless tobacco: Never Used   • Alcohol use No       Social History     Social History Narrative   • No narrative on file       History reviewed. No pertinent family history.      ROS Positive for nasal congestion postnasal drip, productive thick phlegm, wheezing  Patient denies any fever, chills, unintentional weight gain/loss, fastigue, stroke symptoms, dizziness, headache,  sore-throat, heartburn, chest pain, difficulty breathing, abdominal discomfort, diarrhea/constipation, joint or back pain, skin rashes, depression or anxiety.       Objective:     Blood pressure 128/62, pulse 100, temperature 36.7 °C (98.1 °F), resp. rate 19, height 1.689 m (5' 6.5\"), weight 94.3 kg (208 lb), SpO2 98 %. Body mass index is 33.07 kg/m².   Physical Exam:  Constitutional: Alert, no distress.Overweight.  Skin: Warm, dry, good turgor, no rashes in visible areas.  Eye: Equal, round and reactive, conjunctiva clear, lids normal.  ENMT: Lips without lesions, good dentition, oropharynx clear. Nares congested with clear secretions.  Neck: Trachea midline, no masses, no thyromegaly. No cervical or supraclavicular lymphadenopathy  Respiratory: Unlabored " respiratory effort, lungs clear to auscultation, no wheezes, no ronchi.  Cardiovascular: Normal S1, S2, no murmur, no extremity edema.  Psych: Alert and oriented x3, appropriate affect and mood.        Assessment and Plan:   The following treatment plan was discussed    1. Acute seasonal allergic rhinitis, unspecified trigger  Acute problem, requiring intervention. Discussed treatment options. Reviewed effects of Kenalog on her allergy symptoms. We'll also add an oral antihistamine.  - triamcinolone acetonide (KENALOG-40) injection 40 mg; 1 mL by Intramuscular route Once.  - loratadine (CLARITIN) 10 MG Tab; Take 1 Tab by mouth every day.  Dispense: 30 Tab; Refill: 5    2. Tobacco abuse  Problem, requiring intervention. Side effects and possible side effects of Chantix. Patient agreeable to be placed on medicine. We'll reassess in one month.  - varenicline (CHANTIX STARTING MONTH MARK) 0.5 MG X 11 & 1 MG X 42 tablet; Take as directed daily  Dispense: 56 Tab; Refill: 0    3. Obesity (BMI 30-39.9)  Chronic problem, requiring intervention.  - REFERRAL TO [facility] IMPROVEMENT PROGRAMS (HIP) Services Requested: Weight Management Program; Reason for Visit: Overweight/Obesity; Future      Followup: Return in about 4 weeks (around 5/22/2018), or with Dr. ANÍBAL everett/partha allergies/smoking cessation.

## 2018-09-04 ENCOUNTER — OFFICE VISIT (OUTPATIENT)
Dept: MEDICAL GROUP | Facility: PHYSICIAN GROUP | Age: 28
End: 2018-09-04
Payer: COMMERCIAL

## 2018-09-04 VITALS
RESPIRATION RATE: 14 BRPM | HEART RATE: 85 BPM | BODY MASS INDEX: 33.43 KG/M2 | SYSTOLIC BLOOD PRESSURE: 122 MMHG | WEIGHT: 213 LBS | DIASTOLIC BLOOD PRESSURE: 68 MMHG | HEIGHT: 67 IN | OXYGEN SATURATION: 96 % | TEMPERATURE: 98.5 F

## 2018-09-04 DIAGNOSIS — J45.20 MILD INTERMITTENT ASTHMA WITHOUT COMPLICATION: ICD-10-CM

## 2018-09-04 DIAGNOSIS — E66.9 OBESITY (BMI 30-39.9): ICD-10-CM

## 2018-09-04 DIAGNOSIS — R53.82 CHRONIC FATIGUE AND MALAISE: ICD-10-CM

## 2018-09-04 DIAGNOSIS — R53.81 CHRONIC FATIGUE AND MALAISE: ICD-10-CM

## 2018-09-04 DIAGNOSIS — Z72.0 TOBACCO ABUSE: ICD-10-CM

## 2018-09-04 PROBLEM — R09.81 SINUS CONGESTION: Status: RESOLVED | Noted: 2017-09-12 | Resolved: 2018-09-04

## 2018-09-04 PROBLEM — J30.2 ACUTE SEASONAL ALLERGIC RHINITIS: Chronic | Status: RESOLVED | Noted: 2018-04-24 | Resolved: 2018-09-04

## 2018-09-04 PROCEDURE — 99214 OFFICE O/P EST MOD 30 MIN: CPT | Performed by: FAMILY MEDICINE

## 2018-09-04 ASSESSMENT — ENCOUNTER SYMPTOMS
RESPIRATORY NEGATIVE: 1
NEUROLOGICAL NEGATIVE: 1
BLURRED VISION: 0
HEARTBURN: 0
EYES NEGATIVE: 1
DEPRESSION: 0
CONSTITUTIONAL NEGATIVE: 1
DOUBLE VISION: 0
DIZZINESS: 0
BRUISES/BLEEDS EASILY: 0
COUGH: 0
PALPITATIONS: 0
HEMOPTYSIS: 0
CHILLS: 0
MYALGIAS: 0
TINGLING: 0
CARDIOVASCULAR NEGATIVE: 1
HEADACHES: 0
MUSCULOSKELETAL NEGATIVE: 1
FEVER: 0
GASTROINTESTINAL NEGATIVE: 1
PSYCHIATRIC NEGATIVE: 1
NAUSEA: 0

## 2018-09-04 NOTE — PROGRESS NOTES
Subjective:      Joy Isbell is a 27 y.o. female who presents with Asthma            1. Mild intermittent asthma without complication  The patient's current medical issue is well controlled on the current therapy with no new symptoms or worsening    - Mometasone Furo-Formoterol Fum (DULERA) 200-5 MCG/ACT Aerosol; Inhale 1 Puff by mouth 2 Times a Day.  Dispense: 1 Inhaler; Refill: 6    2. Obesity (BMI 30-39.9)        3. Tobacco abuse  Down to 4 cigs per day  Tapering down with   ,Patient is still currently using tobacco. They were counseled on the importance of cessation and various behavioural and pharmacological ways of achieving this.  UNCONTROLLED      Past Medical History:  No date: Asthma  History reviewed. No pertinent surgical history.  Smoking status: Current Every Day Smoker                                                   Packs/day: 0.25      Years: 5.00         Types: Cigarettes  Smokeless tobacco: Never Used                      Alcohol use: No              History reviewed.  No pertinent family history.      Current Outpatient Prescriptions: •  Mometasone Furo-Formoterol Fum (DULERA) 200-5 MCG/ACT Aerosol, Inhale 1 Puff by mouth 2 Times a Day., Disp: 1 Inhaler, Rfl: 6•  loratadine (CLARITIN) 10 MG Tab, Take 1 Tab by mouth every day., Disp: 30 Tab, Rfl: 5•  varenicline (CHANTIX STARTING MONTH PAK) 0.5 MG X 11 & 1 MG X 42 tablet, Take as directed daily, Disp: 56 Tab, Rfl: 0•  albuterol 108 (90 BASE) MCG/ACT Aero Soln inhalation aerosol, Inhale 2 Puffs by mouth every 6 hours as needed for Shortness of Breath., Disp: 8.5 g, Rfl: 3    Patient was instructed on the use of medications, either prescriptions or OTC and informed on when the appropriate follow up time period should be. In addition, patient was also instructed that should any acute worsening occur that they should notify this clinic asap or call 911.            Review of Systems   Constitutional: Negative.  Negative for chills and fever.  "  HENT: Negative.  Negative for hearing loss.    Eyes: Negative.  Negative for blurred vision and double vision.   Respiratory: Negative.  Negative for cough and hemoptysis.    Cardiovascular: Negative.  Negative for chest pain and palpitations.   Gastrointestinal: Negative.  Negative for heartburn and nausea.   Genitourinary: Negative.  Negative for dysuria.   Musculoskeletal: Negative.  Negative for myalgias.   Skin: Negative.  Negative for rash.   Neurological: Negative.  Negative for dizziness, tingling and headaches.   Endo/Heme/Allergies: Negative.  Does not bruise/bleed easily.   Psychiatric/Behavioral: Negative.  Negative for depression and suicidal ideas.   All other systems reviewed and are negative.         Objective:     /68   Pulse 85   Temp 36.9 °C (98.5 °F)   Resp 14   Ht 1.689 m (5' 6.5\")   Wt 96.6 kg (213 lb)   SpO2 96%   BMI 33.86 kg/m²      Physical Exam   Constitutional: She is oriented to person, place, and time. She appears well-developed and well-nourished. No distress.   HENT:   Head: Normocephalic and atraumatic.   Mouth/Throat: Oropharynx is clear and moist. No oropharyngeal exudate.   Eyes: Pupils are equal, round, and reactive to light.   Cardiovascular: Normal rate, regular rhythm, normal heart sounds and intact distal pulses.  Exam reveals no gallop and no friction rub.    No murmur heard.  Pulmonary/Chest: Effort normal and breath sounds normal. No respiratory distress. She has no wheezes. She has no rales. She exhibits no tenderness.   Neurological: She is alert and oriented to person, place, and time.   Skin: She is not diaphoretic.   Psychiatric: She has a normal mood and affect. Her behavior is normal. Judgment and thought content normal.   Nursing note and vitals reviewed.              Assessment/Plan:     1. Mild intermittent asthma without complication    - Mometasone Furo-Formoterol Fum (DULERA) 200-5 MCG/ACT Aerosol; Inhale 1 Puff by mouth 2 Times a Day.  " Dispense: 1 Inhaler; Refill: 6    2. Obesity (BMI 30-39.9)      3. Tobacco abuse

## 2019-08-13 ENCOUNTER — OFFICE VISIT (OUTPATIENT)
Dept: MEDICAL GROUP | Facility: PHYSICIAN GROUP | Age: 29
End: 2019-08-13
Payer: COMMERCIAL

## 2019-08-13 VITALS
HEIGHT: 67 IN | BODY MASS INDEX: 36.02 KG/M2 | WEIGHT: 229.5 LBS | SYSTOLIC BLOOD PRESSURE: 116 MMHG | HEART RATE: 83 BPM | OXYGEN SATURATION: 98 % | TEMPERATURE: 98.8 F | RESPIRATION RATE: 14 BRPM | DIASTOLIC BLOOD PRESSURE: 80 MMHG

## 2019-08-13 DIAGNOSIS — E66.9 OBESITY (BMI 30-39.9): ICD-10-CM

## 2019-08-13 DIAGNOSIS — J45.20 MILD INTERMITTENT ASTHMA WITHOUT COMPLICATION: ICD-10-CM

## 2019-08-13 DIAGNOSIS — Z72.0 TOBACCO ABUSE: ICD-10-CM

## 2019-08-13 PROCEDURE — 99214 OFFICE O/P EST MOD 30 MIN: CPT | Performed by: FAMILY MEDICINE

## 2019-08-13 ASSESSMENT — ENCOUNTER SYMPTOMS
RESPIRATORY NEGATIVE: 1
NAUSEA: 0
HEMOPTYSIS: 0
EYES NEGATIVE: 1
MUSCULOSKELETAL NEGATIVE: 1
HEADACHES: 0
FEVER: 0
GASTROINTESTINAL NEGATIVE: 1
DEPRESSION: 0
HEARTBURN: 0
CONSTITUTIONAL NEGATIVE: 1
BLURRED VISION: 0
NEUROLOGICAL NEGATIVE: 1
MYALGIAS: 0
PSYCHIATRIC NEGATIVE: 1
BRUISES/BLEEDS EASILY: 0
DIZZINESS: 0
COUGH: 0
CARDIOVASCULAR NEGATIVE: 1
DOUBLE VISION: 0
PALPITATIONS: 0
TINGLING: 0
CHILLS: 0

## 2019-08-13 ASSESSMENT — PATIENT HEALTH QUESTIONNAIRE - PHQ9: CLINICAL INTERPRETATION OF PHQ2 SCORE: 0

## 2019-08-13 NOTE — PROGRESS NOTES
Subjective:      Joy Isbell is a 28 y.o. female who presents with Medication Refill            1. Mild intermittent asthma without complication  The patient's current medical issue is well controlled on the current therapy with no new symptoms or worsening    - Mometasone Furo-Formoterol Fum (DULERA) 200-5 MCG/ACT Aerosol; Inhale 1 Puff by mouth 2 Times a Day.  Dispense: 1 Inhaler; Refill: 6  - Albuterol Sulfate (PROAIR RESPICLICK) 108 (90 Base) MCG/ACT AEROSOL POWDER, BREATH ACTIVATED; Inhale 1 Inhalation by mouth 3 times a day as needed.  Dispense: 1 Each; Refill: 5    2. Tobacco abuse  Patient is still currently using tobacco. They were counseled on the importance of cessation and various behavioural and pharmacological ways of achieving this.  UNCONTROLLED      3. Obesity (BMI 30-39.9)      Past Medical History:  No date: Asthma  History reviewed. No pertinent surgical history.  Social History    Tobacco Use      Smoking status: Current Every Day Smoker        Packs/day: 0.25        Years: 5.00        Pack years: 1.25        Types: Cigarettes      Smokeless tobacco: Never Used    Alcohol use: No    Drug use: No    History reviewed.  No pertinent family history.      Current Outpatient Medications: •  Mometasone Furo-Formoterol Fum (DULERA) 200-5 MCG/ACT Aerosol, Inhale 1 Puff by mouth 2 Times a Day., Disp: 1 Inhaler, Rfl: 6•  Albuterol Sulfate (PROAIR RESPICLICK) 108 (90 Base) MCG/ACT AEROSOL POWDER, BREATH ACTIVATED, Inhale 1 Inhalation by mouth 3 times a day as needed., Disp: 1 Each, Rfl: 5•  loratadine (CLARITIN) 10 MG Tab, Take 1 Tab by mouth every day., Disp: 30 Tab, Rfl: 5•  varenicline (CHANTIX STARTING MONTH PAK) 0.5 MG X 11 & 1 MG X 42 tablet, Take as directed daily (Patient not taking: Reported on 8/13/2019), Disp: 56 Tab, Rfl: 0•  albuterol 108 (90 BASE) MCG/ACT Aero Soln inhalation aerosol, Inhale 2 Puffs by mouth every 6 hours as needed for Shortness of Breath. (Patient not taking: Reported on  "8/13/2019), Disp: 8.5 g, Rfl: 3    Patient was instructed on the use of medications, either prescriptions or OTC and informed on when the appropriate follow up time period should be. In addition, patient was also instructed that should any acute worsening occur that they should notify this clinic asap or call 911.          Review of Systems   Constitutional: Negative.  Negative for chills and fever.   HENT: Negative.  Negative for hearing loss.    Eyes: Negative.  Negative for blurred vision and double vision.   Respiratory: Negative.  Negative for cough and hemoptysis.    Cardiovascular: Negative.  Negative for chest pain and palpitations.   Gastrointestinal: Negative.  Negative for heartburn and nausea.   Genitourinary: Negative.  Negative for dysuria.   Musculoskeletal: Negative.  Negative for myalgias.   Skin: Negative.  Negative for rash.   Neurological: Negative.  Negative for dizziness, tingling and headaches.   Endo/Heme/Allergies: Negative.  Does not bruise/bleed easily.   Psychiatric/Behavioral: Negative.  Negative for depression and suicidal ideas.   All other systems reviewed and are negative.         Objective:     /80 (BP Location: Left arm, Patient Position: Sitting, BP Cuff Size: Large adult)   Pulse 83   Temp 37.1 °C (98.8 °F)   Resp 14   Ht 1.702 m (5' 7\")   Wt 104.1 kg (229 lb 8 oz)   SpO2 98%   BMI 35.94 kg/m²      Physical Exam   Constitutional: She is oriented to person, place, and time. She appears well-developed and well-nourished. No distress.   HENT:   Head: Normocephalic and atraumatic.   Mouth/Throat: Oropharynx is clear and moist. No oropharyngeal exudate.   Eyes: Pupils are equal, round, and reactive to light.   Cardiovascular: Normal rate, regular rhythm, normal heart sounds and intact distal pulses. Exam reveals no gallop and no friction rub.   No murmur heard.  Pulmonary/Chest: Effort normal and breath sounds normal. No respiratory distress. She has no wheezes. She has " no rales. She exhibits no tenderness.   Neurological: She is alert and oriented to person, place, and time.   Skin: She is not diaphoretic.   Psychiatric: She has a normal mood and affect. Her behavior is normal. Judgment and thought content normal.   Nursing note and vitals reviewed.              Assessment/Plan:     1. Mild intermittent asthma without complication    - Mometasone Furo-Formoterol Fum (DULERA) 200-5 MCG/ACT Aerosol; Inhale 1 Puff by mouth 2 Times a Day.  Dispense: 1 Inhaler; Refill: 6  - Albuterol Sulfate (PROAIR RESPICLICK) 108 (90 Base) MCG/ACT AEROSOL POWDER, BREATH ACTIVATED; Inhale 1 Inhalation by mouth 3 times a day as needed.  Dispense: 1 Each; Refill: 5    2. Tobacco abuse      3. Obesity (BMI 30-39.9)

## 2019-11-25 ENCOUNTER — TELEPHONE (OUTPATIENT)
Dept: MEDICAL GROUP | Facility: PHYSICIAN GROUP | Age: 29
End: 2019-11-25

## 2019-11-26 ENCOUNTER — TELEPHONE (OUTPATIENT)
Dept: MEDICAL GROUP | Facility: PHYSICIAN GROUP | Age: 29
End: 2019-11-26

## 2019-11-26 NOTE — TELEPHONE ENCOUNTER
Pt called stating that her Dulera is being denied by her insurance. She is currently out and would like an alternative according to her plan they may cover advair or breo ellipta instead and she would like to get one of those sent to her pharmacy while we try to appeal the denial of her dulera.

## 2019-12-17 ENCOUNTER — TELEPHONE (OUTPATIENT)
Dept: MEDICAL GROUP | Facility: PHYSICIAN GROUP | Age: 29
End: 2019-12-17

## 2019-12-17 RX ORDER — ALBUTEROL SULFATE 90 UG/1
2 AEROSOL, METERED RESPIRATORY (INHALATION) EVERY 6 HOURS PRN
Qty: 8.5 G | Refills: 3 | Status: SHIPPED | OUTPATIENT
Start: 2019-12-17 | End: 2019-12-29

## 2019-12-17 NOTE — TELEPHONE ENCOUNTER
Pt sent [employer]t message stating that she is still having a lot of trouble getting her inhalers filled. Pt was able to get information about what inhalers her insurance will cover. Per pt they will cover Incruse Ellipta, Pulmicort Flexhaler, or QVAR Redihaler. Pt stated that they will also cover Ventolin HFA as a rescue inhaler and per pt she will be needing a new one of those as well. She would like to get one these sent over to [facility].

## 2019-12-24 ENCOUNTER — TELEPHONE (OUTPATIENT)
Dept: MEDICAL GROUP | Facility: PHYSICIAN GROUP | Age: 29
End: 2019-12-24

## 2019-12-24 RX ORDER — METHYLPREDNISOLONE 4 MG/1
TABLET ORAL
Qty: 21 TAB | Refills: 0 | Status: SHIPPED | OUTPATIENT
Start: 2019-12-24 | End: 2023-03-13

## 2019-12-24 NOTE — TELEPHONE ENCOUNTER
Pt sent a mychart stating that she is still struggling with her asthma and the new inhalers she just switched to. She would like to get a prescription for prednisone to help her adjust to the new inhalers.

## 2019-12-29 ENCOUNTER — OFFICE VISIT (OUTPATIENT)
Dept: URGENT CARE | Facility: CLINIC | Age: 29
End: 2019-12-29
Payer: COMMERCIAL

## 2019-12-29 VITALS
DIASTOLIC BLOOD PRESSURE: 86 MMHG | HEIGHT: 67 IN | RESPIRATION RATE: 22 BRPM | HEART RATE: 113 BPM | WEIGHT: 224 LBS | SYSTOLIC BLOOD PRESSURE: 128 MMHG | OXYGEN SATURATION: 94 % | BODY MASS INDEX: 35.16 KG/M2 | TEMPERATURE: 98.3 F

## 2019-12-29 DIAGNOSIS — J45.901 SEVERE ASTHMA WITH EXACERBATION, UNSPECIFIED WHETHER PERSISTENT: ICD-10-CM

## 2019-12-29 PROCEDURE — 99214 OFFICE O/P EST MOD 30 MIN: CPT | Performed by: FAMILY MEDICINE

## 2019-12-29 RX ORDER — IPRATROPIUM BROMIDE AND ALBUTEROL SULFATE 2.5; .5 MG/3ML; MG/3ML
3 SOLUTION RESPIRATORY (INHALATION) ONCE
Status: COMPLETED | OUTPATIENT
Start: 2019-12-29 | End: 2019-12-29

## 2019-12-29 RX ADMIN — IPRATROPIUM BROMIDE AND ALBUTEROL SULFATE 3 ML: 2.5; .5 SOLUTION RESPIRATORY (INHALATION) at 17:01

## 2019-12-30 NOTE — PROGRESS NOTES
"Subjective:      Joy Isbell is a 29 y.o. female who presents with Asthma (using her nebulizer every 2 hours) and Cough            29-year-old female with longstanding history of asthma presenting for evaluation of asthma exacerbation and worsening since 24 December.  She called her primary care doctor and was started on Medrol Dosepak, last dose of it was done this morning.  She has been using albuterol inhaler almost every 2 hours without much improvement.  She is to be on a steroid controller inhaler that has controlled her asthma very good throughout the years but recently an insurance change in mandate changed medication to something different which has not worked for her very well.  She has not been hospitalized for asthma exacerbation for at least 2 years.  No fever reported.      Review of Systems   All other systems reviewed and are negative.         Objective:   /86   Pulse (!) 113   Temp 36.8 °C (98.3 °F) (Temporal)   Resp (!) 22   Ht 1.702 m (5' 7\")   Wt 101.6 kg (224 lb)   SpO2 94%   BMI 35.08 kg/m²     Physical Exam  Constitutional:       Appearance: She is not toxic-appearing or diaphoretic.   HENT:      Head: Normocephalic and atraumatic.      Right Ear: External ear normal.      Left Ear: External ear normal.      Nose: Nose normal.      Mouth/Throat:      Mouth: Mucous membranes are moist.      Pharynx: Oropharynx is clear.   Eyes:      Conjunctiva/sclera: Conjunctivae normal.   Neck:      Musculoskeletal: Neck supple.   Cardiovascular:      Rate and Rhythm: Regular rhythm. Tachycardia present.      Heart sounds: No murmur. No friction rub. No gallop.    Pulmonary:      Effort: Tachypnea present. No accessory muscle usage or retractions.      Breath sounds: No stridor. Wheezing and rhonchi present.   Skin:     General: Skin is warm.      Coloration: Skin is not jaundiced or pale.      Findings: No rash.   Neurological:      Mental Status: She is alert and oriented to person, place, " and time.   Psychiatric:         Mood and Affect: Mood normal.               Assessment/Plan:       1. Severe asthma with exacerbation, unspecified whether persistent    - ipratropium-albuterol (DUONEB) nebulizer solution      In moderate to severe asthma exacerbation with no improvement using bronchodilator frequently at home and absolutely no improvement after a DuoNeb here.  She is also finished oral Medrol dose pack today.  Unfortunately based on the above symptoms and the fact that she failed outpatient treatment she would be needing immediate evaluation in the ED setting and most likely be needing hospitalization.  Contacted local ED department and discussed with the staff and information given.  Patient will go there with her  who was here present with her.

## 2019-12-31 ENCOUNTER — OFFICE VISIT (OUTPATIENT)
Dept: MEDICAL GROUP | Facility: PHYSICIAN GROUP | Age: 29
End: 2019-12-31
Payer: COMMERCIAL

## 2019-12-31 VITALS
DIASTOLIC BLOOD PRESSURE: 76 MMHG | WEIGHT: 223 LBS | TEMPERATURE: 98.2 F | HEART RATE: 91 BPM | HEIGHT: 67 IN | OXYGEN SATURATION: 95 % | BODY MASS INDEX: 35 KG/M2 | SYSTOLIC BLOOD PRESSURE: 120 MMHG

## 2019-12-31 DIAGNOSIS — Z23 NEED FOR VACCINATION: ICD-10-CM

## 2019-12-31 DIAGNOSIS — J45.20 MILD INTERMITTENT ASTHMA WITHOUT COMPLICATION: ICD-10-CM

## 2019-12-31 PROCEDURE — 90686 IIV4 VACC NO PRSV 0.5 ML IM: CPT | Performed by: FAMILY MEDICINE

## 2019-12-31 PROCEDURE — 99214 OFFICE O/P EST MOD 30 MIN: CPT | Mod: 25 | Performed by: FAMILY MEDICINE

## 2019-12-31 PROCEDURE — 90471 IMMUNIZATION ADMIN: CPT | Performed by: FAMILY MEDICINE

## 2019-12-31 RX ORDER — PREDNISONE 5 MG/1
5 TABLET ORAL DAILY
COMMUNITY
End: 2023-03-13

## 2019-12-31 ASSESSMENT — ENCOUNTER SYMPTOMS
WHEEZING: 1
BLURRED VISION: 0
MYALGIAS: 0
CARDIOVASCULAR NEGATIVE: 1
PSYCHIATRIC NEGATIVE: 1
BRUISES/BLEEDS EASILY: 0
DOUBLE VISION: 0
HEADACHES: 0
MUSCULOSKELETAL NEGATIVE: 1
DIZZINESS: 0
GASTROINTESTINAL NEGATIVE: 1
DEPRESSION: 0
SHORTNESS OF BREATH: 1
NEUROLOGICAL NEGATIVE: 1
HEMOPTYSIS: 0
NAUSEA: 0
FEVER: 0
PALPITATIONS: 0
COUGH: 1
CONSTITUTIONAL NEGATIVE: 1
HEARTBURN: 0
EYES NEGATIVE: 1
CHILLS: 0
TINGLING: 0

## 2019-12-31 NOTE — PROGRESS NOTES
Subjective:      Joy Isbell is a 29 y.o. female who presents with Follow-Up (FV on visit from [facility] 2019. )            History of asthma - got an URI from  this month that triggered worsening of sx  Went to er and given iv steroids and improved but still with some wheezing and sob  Has a rx for dulera and steroid taper but has not filled it yet  Today on exam slight expiratory wheeze present   Will have her start taking the medrol taper she has at home and continue with other meds  F/u should this not improve or worsen      1. Need for vaccination    - Influenza Vaccine Quad Injection (PF)    2. Mild intermittent asthma without complication  - Mometasone Furo-Formoterol Fum (DULERA) 100-5 MCG/ACT Aerosol; Inhale 1 Inhalation by mouth 2 Times a Day.  Dispense: 1 Inhaler; Refill: 4    Past Medical History:  No date: Asthma  History reviewed. No pertinent surgical history.  Social History    Tobacco Use      Smoking status: Former Smoker        Packs/day: 0.25        Years: 5.00        Pack years: 1.25        Types: Cigarettes        Quit date: 2019        Years since quittin.0      Smokeless tobacco: Never Used    Alcohol use: Yes      Comment: occ    Drug use: No    History reviewed.  No pertinent family history.      Current Outpatient Medications: •  predniSONE (DELTASONE) 5 MG Tab, Take 5 mg by mouth every day., Disp: , Rfl: •  Mometasone Furo-Formoterol Fum (DULERA) 100-5 MCG/ACT Aerosol, Inhale 1 Inhalation by mouth 2 Times a Day., Disp: 1 Inhaler, Rfl: 4•  Umeclidinium Bromide (INCRUSE ELLIPTA) 62.5 MCG/INH AEROSOL POWDER, BREATH ACTIVATED, Inhale 1 Dose by mouth every day., Disp: 1 Each, Rfl: 4•  Albuterol Sulfate (PROAIR RESPICLICK) 108 (90 Base) MCG/ACT AEROSOL POWDER, BREATH ACTIVATED, Inhale 1 Inhalation by mouth 3 times a day as needed., Disp: 1 Each, Rfl: 5•  methylPREDNISolone (MEDROL DOSEPAK) 4 MG Tablet Therapy Pack, As directed on the packaging label. (Patient not  "taking: Reported on 12/31/2019), Disp: 21 Tab, Rfl: 0    Patient was instructed on the use of medications, either prescriptions or OTC and informed on when the appropriate follow up time period should be. In addition, patient was also instructed that should any acute worsening occur that they should notify this clinic asap or call 911.          Review of Systems   Constitutional: Negative.  Negative for chills and fever.   HENT: Negative.  Negative for hearing loss.    Eyes: Negative.  Negative for blurred vision and double vision.   Respiratory: Positive for cough, shortness of breath and wheezing. Negative for hemoptysis.    Cardiovascular: Negative.  Negative for chest pain and palpitations.   Gastrointestinal: Negative.  Negative for heartburn and nausea.   Genitourinary: Negative.  Negative for dysuria.   Musculoskeletal: Negative.  Negative for myalgias.   Skin: Negative.  Negative for rash.   Neurological: Negative.  Negative for dizziness, tingling and headaches.   Endo/Heme/Allergies: Negative.  Does not bruise/bleed easily.   Psychiatric/Behavioral: Negative.  Negative for depression and suicidal ideas.   All other systems reviewed and are negative.         Objective:     /76 (BP Location: Left arm, Patient Position: Sitting, BP Cuff Size: Large adult)   Pulse 91   Temp 36.8 °C (98.2 °F)   Ht 1.702 m (5' 7\")   Wt 101.2 kg (223 lb)   SpO2 95%   BMI 34.93 kg/m²      Physical Exam  Vitals signs and nursing note reviewed.   Constitutional:       General: She is not in acute distress.     Appearance: She is well-developed. She is not diaphoretic.   HENT:      Head: Normocephalic and atraumatic.      Mouth/Throat:      Pharynx: No oropharyngeal exudate.   Eyes:      Pupils: Pupils are equal, round, and reactive to light.   Cardiovascular:      Rate and Rhythm: Normal rate and regular rhythm.      Heart sounds: Normal heart sounds. No murmur. No friction rub. No gallop.    Pulmonary:      Effort: " Pulmonary effort is normal. No respiratory distress.      Breath sounds: Wheezing present. No rales.   Chest:      Chest wall: No tenderness.   Neurological:      Mental Status: She is alert and oriented to person, place, and time.   Psychiatric:         Behavior: Behavior normal.         Thought Content: Thought content normal.         Judgment: Judgment normal.                 Assessment/Plan:       1. Need for vaccination    - Influenza Vaccine Quad Injection (PF)    2. Mild intermittent asthma without complication    - Mometasone Furo-Formoterol Fum (DULERA) 100-5 MCG/ACT Aerosol; Inhale 1 Inhalation by mouth 2 Times a Day.  Dispense: 1 Inhaler; Refill: 4

## 2020-02-10 DIAGNOSIS — J45.20 MILD INTERMITTENT ASTHMA WITHOUT COMPLICATION: ICD-10-CM

## 2020-07-12 DIAGNOSIS — J45.20 MILD INTERMITTENT ASTHMA WITHOUT COMPLICATION: ICD-10-CM

## 2020-07-13 RX ORDER — ALBUTEROL SULFATE 90 UG/1
1 POWDER, METERED RESPIRATORY (INHALATION) 3 TIMES DAILY PRN
Qty: 1 EACH | Refills: 5 | Status: SHIPPED | OUTPATIENT
Start: 2020-07-13 | End: 2023-03-13

## 2020-07-21 RX ORDER — BUDESONIDE AND FORMOTEROL FUMARATE DIHYDRATE 160; 4.5 UG/1; UG/1
2 AEROSOL RESPIRATORY (INHALATION) 2 TIMES DAILY
Qty: 1 G | Refills: 5 | Status: SHIPPED | OUTPATIENT
Start: 2020-07-21 | End: 2020-07-22 | Stop reason: SDUPTHER

## 2020-07-22 ENCOUNTER — PATIENT MESSAGE (OUTPATIENT)
Dept: MEDICAL GROUP | Facility: PHYSICIAN GROUP | Age: 30
End: 2020-07-22

## 2020-07-22 RX ORDER — BUDESONIDE AND FORMOTEROL FUMARATE DIHYDRATE 160; 4.5 UG/1; UG/1
2 AEROSOL RESPIRATORY (INHALATION) 2 TIMES DAILY
Qty: 1 G | Refills: 5 | Status: SHIPPED | OUTPATIENT
Start: 2020-07-22 | End: 2020-08-25 | Stop reason: SDUPTHER

## 2020-07-22 RX ORDER — BUDESONIDE AND FORMOTEROL FUMARATE DIHYDRATE 160; 4.5 UG/1; UG/1
2 AEROSOL RESPIRATORY (INHALATION) 2 TIMES DAILY
COMMUNITY
End: 2021-02-24 | Stop reason: SDUPTHER

## 2020-07-22 NOTE — TELEPHONE ENCOUNTER
"----- Message from Joy Isbell sent at 7/22/2020  1:14 PM PDT -----  Regarding: Prescription Question  Contact: [phone]  Hi there,    The submitted prescription was denied. I called the insurance company and spoke with two people, both said this is because of the way it is being submitted. The rx must say Symbicort for the one month supply. The use of the other name, \"Budesonide...\", is not covered and using specifically Symbicort is the preferred \"alternative.\" Please resubmit with smiths with the Symbicort specifically listed so we can see if this is the trick.     Thank you for your patience with my insurance :)  "

## 2020-08-25 NOTE — TELEPHONE ENCOUNTER
Refill request    Budesonide-formoterol (Symbicort)  160-4.5 MCG/ACT Aerosol   Inhale 2 Puffs by mouth 2X daily  Qty 1 G

## 2020-08-31 RX ORDER — BUDESONIDE AND FORMOTEROL FUMARATE DIHYDRATE 160; 4.5 UG/1; UG/1
2 AEROSOL RESPIRATORY (INHALATION) 2 TIMES DAILY
Qty: 1 G | Refills: 5 | Status: SHIPPED | OUTPATIENT
Start: 2020-08-31 | End: 2021-03-03 | Stop reason: SDUPTHER

## 2020-09-01 ENCOUNTER — TELEPHONE (OUTPATIENT)
Dept: MEDICAL GROUP | Facility: PHYSICIAN GROUP | Age: 30
End: 2020-09-01

## 2020-09-01 NOTE — TELEPHONE ENCOUNTER
Complete Prior Auth for Symbicort I 60-4.5 MCG/ACT Aerosol      [employer]    Key: [id number]  Patient Name: Sukhi  PB 1990

## 2021-01-21 ENCOUNTER — OFFICE VISIT (OUTPATIENT)
Dept: URGENT CARE | Facility: CLINIC | Age: 31
End: 2021-01-21
Payer: COMMERCIAL

## 2021-01-21 VITALS
SYSTOLIC BLOOD PRESSURE: 156 MMHG | RESPIRATION RATE: 16 BRPM | HEART RATE: 102 BPM | WEIGHT: 242 LBS | BODY MASS INDEX: 37.98 KG/M2 | TEMPERATURE: 98.7 F | DIASTOLIC BLOOD PRESSURE: 96 MMHG | HEIGHT: 67 IN | OXYGEN SATURATION: 97 %

## 2021-01-21 DIAGNOSIS — M77.12 LATERAL EPICONDYLITIS OF LEFT ELBOW: ICD-10-CM

## 2021-01-21 DIAGNOSIS — M75.22 BICEPS TENDONITIS ON LEFT: ICD-10-CM

## 2021-01-21 PROCEDURE — 99213 OFFICE O/P EST LOW 20 MIN: CPT | Performed by: PHYSICIAN ASSISTANT

## 2021-01-21 RX ORDER — IBUPROFEN 800 MG/1
800 TABLET ORAL EVERY 8 HOURS PRN
Qty: 30 TAB | Refills: 0 | Status: SHIPPED | OUTPATIENT
Start: 2021-01-21 | End: 2023-03-13

## 2021-01-21 ASSESSMENT — ENCOUNTER SYMPTOMS
WEAKNESS: 0
CHILLS: 0
HEADACHES: 0
NAUSEA: 0
FEVER: 0
MYALGIAS: 1
COUGH: 0
DIAPHORESIS: 0
FALLS: 0
TINGLING: 1

## 2021-01-21 NOTE — PROGRESS NOTES
Subjective:   Joy Isbell is a 30 y.o. female who presents for Arm Pain (left elbow pain)      HPI:  This is a very pleasant 30-year-old female presenting to the clinic with left elbow pain x4 weeks.  Patient denies any traumatic event or acute injury.  She does inform me around the same timeframe is when she started going to yoga.  Prior to starting yoga she had not done very much physical activity in some time.  Currently she describes it as a mild dull ache.  The pain is fairly constant located predominantly in the antecubital fossa.  She also has pain over the lateral aspect of the left elbow with wrist movement.  Certain arm and wrist movements seem to aggravate the pain.  She also informs me she has occasional paresthesias into the left hand.  She denies any loss of range of motion.  Denies any loss of sensation distally.  She has not tried any OTC medications or treatments for her pain.  Denies any elbow discoloration, swelling, nausea, vomiting, fevers or chills.    Review of Systems   Constitutional: Negative for chills, diaphoresis, fever and malaise/fatigue.   Respiratory: Negative for cough.    Cardiovascular: Negative for chest pain.   Gastrointestinal: Negative for nausea.   Musculoskeletal: Positive for joint pain and myalgias. Negative for falls.   Skin: Negative for rash.        Denies any skin discoloration.   Neurological: Positive for tingling (Occasional numbness and tingling to all digits of left hand). Negative for weakness and headaches.       Medications:    • budesonide-formoterol Aero  • ibuprofen Tabs  • methylPREDNISolone Tbpk  • Mometasone Furo-Formoterol Fum Aero  • predniSONE Tabs  • ProAir RespiClick Aepb  • Umeclidinium Bromide Aepb    Allergies: Patient has no known allergies.    Problem List: Joy Isbell has Mild intermittent asthma without complication; Tobacco abuse; and Obesity (BMI 30-39.9) on their problem list.    Surgical History:  No past surgical history on  "file.    Past Social Hx: Joy Isbell  reports that she quit smoking about 12 months ago. Her smoking use included cigarettes. She has a 1.25 pack-year smoking history. She has never used smokeless tobacco. She reports current alcohol use. She reports current drug use. Drug: Marijuana.     Past Family Hx:  Joy Isbell family history is not on file.     Problem list, medications, and allergies reviewed by myself today in Epic.     Objective:     /96 (BP Location: Right arm, Patient Position: Sitting)   Pulse (!) 102   Temp 37.1 °C (98.7 °F)   Resp 16   Ht 1.702 m (5' 7\")   Wt 109.8 kg (242 lb)   SpO2 97%   BMI 37.90 kg/m²     Physical Exam  Constitutional:       General: She is not in acute distress.     Appearance: Normal appearance. She is not ill-appearing, toxic-appearing or diaphoretic.   HENT:      Head: Normocephalic and atraumatic.   Eyes:      Conjunctiva/sclera: Conjunctivae normal.   Neck:      Musculoskeletal: Normal range of motion. No muscular tenderness.   Cardiovascular:      Rate and Rhythm: Normal rate and regular rhythm.   Musculoskeletal:      Left elbow: She exhibits normal range of motion, no swelling, no effusion and no deformity. Tenderness found. Lateral epicondyle tenderness noted.      Comments: Left elbow:  No obvious deformity or discoloration.  No edema noted.  Patient has mild tenderness to palpation over the lateral epicondyle and distal biceps tendon.  No tenderness over the olecranon, radial head or medial epicondyle.  Patient demonstrates full shoulder, elbow and wrist range of motion.  Bilateral upper extremity strength is 5/5.  Sensation intact distally.  Patient has increased pain with resisted wrist extension and resisted supination.   Skin:     General: Skin is warm.      Capillary Refill: Capillary refill takes less than 2 seconds.      Findings: No bruising or rash.   Neurological:      General: No focal deficit present.      Mental Status: She is alert and " oriented to person, place, and time. Mental status is at baseline.           Assessment/Plan:     Diagnosis and associated orders:     1. Biceps tendonitis on left  ibuprofen (MOTRIN) 800 MG Tab   2. Lateral epicondylitis of left elbow  ibuprofen (MOTRIN) 800 MG Tab      Comments/MDM:       • Patient signs and symptoms are consistent with overuse injuries.  Seems to have distal biceps tendinitis as well as lateral epicondylitis of the left elbow.  • At this time I recommended relative rest of the left arm.  • May take ibuprofen as directed to decrease inflammation.  Tylenol for any breakthrough pain.  • Encouraged ice and elevation while at home.  • May apply wrist brace to avoid aggravating lateral epicondylitis.  • Return to the clinic if symptoms worsen or fail to improve.  Please call with any questions or concerns.               Differential diagnosis, natural history, supportive care, and indications for immediate follow-up discussed.    Advised the patient to follow-up with the primary care physician for recheck, reevaluation, and consideration of further management.    Please note that this dictation was created using voice recognition software. I have made reasonable attempt to correct obvious errors, but I expect that there are errors of grammar and possibly content that I did not discover before finalizing the note.    This note was electronically signed by LUCINA Cooper PA-C

## 2021-02-24 RX ORDER — BUDESONIDE AND FORMOTEROL FUMARATE DIHYDRATE 160; 4.5 UG/1; UG/1
2 AEROSOL RESPIRATORY (INHALATION) 2 TIMES DAILY
Qty: 3 EACH | Refills: 3 | Status: SHIPPED | OUTPATIENT
Start: 2021-02-24 | End: 2022-05-31 | Stop reason: SDUPTHER

## 2021-02-24 NOTE — TELEPHONE ENCOUNTER
Received request via: Patient    Was the patient seen in the last year in this department? Yes    Does the patient have an active prescription (recently filled or refills available) for medication(s) requested? No     Requested Prescriptions     Pending Prescriptions Disp Refills   • budesonide-formoterol (SYMBICORT) 160-4.5 MCG/ACT Aerosol 3 Each 3     Sig: Inhale 2 Puffs 2 Times a Day.

## 2021-03-03 RX ORDER — BUDESONIDE AND FORMOTEROL FUMARATE DIHYDRATE 160; 4.5 UG/1; UG/1
2 AEROSOL RESPIRATORY (INHALATION) 2 TIMES DAILY
Qty: 1 G | Refills: 5 | Status: SHIPPED | OUTPATIENT
Start: 2021-03-03 | End: 2023-03-13

## 2021-03-03 NOTE — TELEPHONE ENCOUNTER
Received request via: Patient    Was the patient seen in the last year in this department? Yes    Does the patient have an active prescription (recently filled or refills available) for medication(s) requested? No     Patient changed pharmacies

## 2022-05-31 ENCOUNTER — PATIENT MESSAGE (OUTPATIENT)
Dept: MEDICAL GROUP | Facility: PHYSICIAN GROUP | Age: 32
End: 2022-05-31
Payer: COMMERCIAL

## 2022-05-31 RX ORDER — BUDESONIDE AND FORMOTEROL FUMARATE DIHYDRATE 160; 4.5 UG/1; UG/1
2 AEROSOL RESPIRATORY (INHALATION) 2 TIMES DAILY
Qty: 3 EACH | Refills: 3 | Status: SHIPPED | OUTPATIENT
Start: 2022-05-31 | End: 2023-03-13 | Stop reason: SDUPTHER

## 2022-05-31 NOTE — PATIENT COMMUNICATION
Received request via: Patient    Was the patient seen in the last year in this department? No    Does the patient have an active prescription (recently filled or refills available) for medication(s) requested? No     No follow up appt

## 2023-03-10 RX ORDER — BUDESONIDE AND FORMOTEROL FUMARATE DIHYDRATE 160; 4.5 UG/1; UG/1
2 AEROSOL RESPIRATORY (INHALATION) 2 TIMES DAILY
Qty: 3 EACH | Refills: 3 | OUTPATIENT
Start: 2023-03-10

## 2023-03-10 NOTE — TELEPHONE ENCOUNTER
Received request via: Patient    Was the patient seen in the last year in this department? No    Does the patient have an active prescription (recently filled or refills available) for medication(s) requested? No    Does the patient have long-term Plus and need 100 day supply (blood pressure, diabetes and cholesterol meds only)? Patient does not have SCP      **Pt hasn't been seen since 2019. [employer] message sent to schedule appt.

## 2023-03-13 ENCOUNTER — OFFICE VISIT (OUTPATIENT)
Dept: MEDICAL GROUP | Facility: PHYSICIAN GROUP | Age: 33
End: 2023-03-13
Payer: COMMERCIAL

## 2023-03-13 VITALS
BODY MASS INDEX: 36.22 KG/M2 | OXYGEN SATURATION: 98 % | HEART RATE: 106 BPM | HEIGHT: 68 IN | RESPIRATION RATE: 16 BRPM | DIASTOLIC BLOOD PRESSURE: 64 MMHG | TEMPERATURE: 98.2 F | WEIGHT: 239 LBS | SYSTOLIC BLOOD PRESSURE: 126 MMHG

## 2023-03-13 DIAGNOSIS — Z00.00 PHYSICAL EXAM, ANNUAL: ICD-10-CM

## 2023-03-13 DIAGNOSIS — J45.20 MILD INTERMITTENT ASTHMA WITHOUT COMPLICATION: ICD-10-CM

## 2023-03-13 DIAGNOSIS — J02.9 SORE THROAT: ICD-10-CM

## 2023-03-13 LAB
INT CON NEG: NEGATIVE
INT CON POS: POSITIVE
S PYO AG THROAT QL: NEGATIVE

## 2023-03-13 PROCEDURE — 99385 PREV VISIT NEW AGE 18-39: CPT | Performed by: PHYSICIAN ASSISTANT

## 2023-03-13 PROCEDURE — 87880 STREP A ASSAY W/OPTIC: CPT | Performed by: PHYSICIAN ASSISTANT

## 2023-03-13 RX ORDER — BUDESONIDE AND FORMOTEROL FUMARATE DIHYDRATE 160; 4.5 UG/1; UG/1
2 AEROSOL RESPIRATORY (INHALATION) 2 TIMES DAILY
Qty: 3 EACH | Refills: 0 | Status: SHIPPED | OUTPATIENT
Start: 2023-03-13 | End: 2023-03-28 | Stop reason: SDUPTHER

## 2023-03-13 SDOH — ECONOMIC STABILITY: HOUSING INSECURITY
IN THE LAST 12 MONTHS, WAS THERE A TIME WHEN YOU DID NOT HAVE A STEADY PLACE TO SLEEP OR SLEPT IN A SHELTER (INCLUDING NOW)?: NO

## 2023-03-13 SDOH — ECONOMIC STABILITY: INCOME INSECURITY: HOW HARD IS IT FOR YOU TO PAY FOR THE VERY BASICS LIKE FOOD, HOUSING, MEDICAL CARE, AND HEATING?: NOT VERY HARD

## 2023-03-13 SDOH — HEALTH STABILITY: PHYSICAL HEALTH: ON AVERAGE, HOW MANY MINUTES DO YOU ENGAGE IN EXERCISE AT THIS LEVEL?: 30 MIN

## 2023-03-13 SDOH — ECONOMIC STABILITY: TRANSPORTATION INSECURITY
IN THE PAST 12 MONTHS, HAS LACK OF TRANSPORTATION KEPT YOU FROM MEETINGS, WORK, OR FROM GETTING THINGS NEEDED FOR DAILY LIVING?: NO

## 2023-03-13 SDOH — ECONOMIC STABILITY: INCOME INSECURITY: IN THE LAST 12 MONTHS, WAS THERE A TIME WHEN YOU WERE NOT ABLE TO PAY THE MORTGAGE OR RENT ON TIME?: NO

## 2023-03-13 SDOH — ECONOMIC STABILITY: TRANSPORTATION INSECURITY
IN THE PAST 12 MONTHS, HAS THE LACK OF TRANSPORTATION KEPT YOU FROM MEDICAL APPOINTMENTS OR FROM GETTING MEDICATIONS?: NO

## 2023-03-13 SDOH — ECONOMIC STABILITY: HOUSING INSECURITY: IN THE LAST 12 MONTHS, HOW MANY PLACES HAVE YOU LIVED?: 1

## 2023-03-13 SDOH — ECONOMIC STABILITY: FOOD INSECURITY: WITHIN THE PAST 12 MONTHS, YOU WORRIED THAT YOUR FOOD WOULD RUN OUT BEFORE YOU GOT MONEY TO BUY MORE.: NEVER TRUE

## 2023-03-13 SDOH — HEALTH STABILITY: PHYSICAL HEALTH: ON AVERAGE, HOW MANY DAYS PER WEEK DO YOU ENGAGE IN MODERATE TO STRENUOUS EXERCISE (LIKE A BRISK WALK)?: 3 DAYS

## 2023-03-13 SDOH — ECONOMIC STABILITY: FOOD INSECURITY: WITHIN THE PAST 12 MONTHS, THE FOOD YOU BOUGHT JUST DIDN'T LAST AND YOU DIDN'T HAVE MONEY TO GET MORE.: NEVER TRUE

## 2023-03-13 SDOH — ECONOMIC STABILITY: TRANSPORTATION INSECURITY
IN THE PAST 12 MONTHS, HAS LACK OF RELIABLE TRANSPORTATION KEPT YOU FROM MEDICAL APPOINTMENTS, MEETINGS, WORK OR FROM GETTING THINGS NEEDED FOR DAILY LIVING?: NO

## 2023-03-13 SDOH — HEALTH STABILITY: MENTAL HEALTH
STRESS IS WHEN SOMEONE FEELS TENSE, NERVOUS, ANXIOUS, OR CAN'T SLEEP AT NIGHT BECAUSE THEIR MIND IS TROUBLED. HOW STRESSED ARE YOU?: TO SOME EXTENT

## 2023-03-13 ASSESSMENT — SOCIAL DETERMINANTS OF HEALTH (SDOH)
HOW OFTEN DO YOU HAVE SIX OR MORE DRINKS ON ONE OCCASION: NEVER
HOW OFTEN DO YOU HAVE A DRINK CONTAINING ALCOHOL: NEVER
HOW OFTEN DO YOU ATTENT MEETINGS OF THE CLUB OR ORGANIZATION YOU BELONG TO?: NEVER
HOW MANY DRINKS CONTAINING ALCOHOL DO YOU HAVE ON A TYPICAL DAY WHEN YOU ARE DRINKING: PATIENT DOES NOT DRINK
WITHIN THE PAST 12 MONTHS, YOU WORRIED THAT YOUR FOOD WOULD RUN OUT BEFORE YOU GOT THE MONEY TO BUY MORE: NEVER TRUE
DO YOU BELONG TO ANY CLUBS OR ORGANIZATIONS SUCH AS CHURCH GROUPS UNIONS, FRATERNAL OR ATHLETIC GROUPS, OR SCHOOL GROUPS?: NO
HOW HARD IS IT FOR YOU TO PAY FOR THE VERY BASICS LIKE FOOD, HOUSING, MEDICAL CARE, AND HEATING?: NOT VERY HARD
HOW OFTEN DO YOU ATTEND CHURCH OR RELIGIOUS SERVICES?: NEVER
IN A TYPICAL WEEK, HOW MANY TIMES DO YOU TALK ON THE PHONE WITH FAMILY, FRIENDS, OR NEIGHBORS?: MORE THAN THREE TIMES A WEEK
HOW OFTEN DO YOU GET TOGETHER WITH FRIENDS OR RELATIVES?: MORE THAN THREE TIMES A WEEK
HOW OFTEN DO YOU ATTEND CHURCH OR RELIGIOUS SERVICES?: NEVER
DO YOU BELONG TO ANY CLUBS OR ORGANIZATIONS SUCH AS CHURCH GROUPS UNIONS, FRATERNAL OR ATHLETIC GROUPS, OR SCHOOL GROUPS?: NO
HOW OFTEN DO YOU ATTENT MEETINGS OF THE CLUB OR ORGANIZATION YOU BELONG TO?: NEVER
IN A TYPICAL WEEK, HOW MANY TIMES DO YOU TALK ON THE PHONE WITH FAMILY, FRIENDS, OR NEIGHBORS?: MORE THAN THREE TIMES A WEEK
HOW OFTEN DO YOU GET TOGETHER WITH FRIENDS OR RELATIVES?: MORE THAN THREE TIMES A WEEK

## 2023-03-13 ASSESSMENT — PATIENT HEALTH QUESTIONNAIRE - PHQ9: CLINICAL INTERPRETATION OF PHQ2 SCORE: 0

## 2023-03-13 ASSESSMENT — LIFESTYLE VARIABLES
HOW MANY STANDARD DRINKS CONTAINING ALCOHOL DO YOU HAVE ON A TYPICAL DAY: PATIENT DOES NOT DRINK
HOW OFTEN DO YOU HAVE A DRINK CONTAINING ALCOHOL: NEVER
AUDIT-C TOTAL SCORE: 0
HOW OFTEN DO YOU HAVE SIX OR MORE DRINKS ON ONE OCCASION: NEVER
SKIP TO QUESTIONS 9-10: 1

## 2023-03-13 NOTE — PROGRESS NOTES
CC:    Chief Complaint   Patient presents with    [facility]     States daughter has strep and pt wants to get tested for it     Medication Refill     budesonide-formoterol 90 day supply per insurance        HISTORY OF THE PRESENT ILLNESS: Patient is a 32 y.o. female presenting to establish primary care     Pt on symbicort for asthma and is working excellent.   Pt has hx of IBD with constipation. Followed by GI.   Pt's daughter recently diagnosed with strep throat. Pt having mildly uncomfortable sore throat.    No problem-specific Assessment & Plan notes found for this encounter.    Allergies: Patient has no known allergies.    Current Outpatient Medications Ordered in Epic   Medication Sig Dispense Refill    budesonide-formoterol (SYMBICORT) 160-4.5 MCG/ACT Aerosol Inhale 2 Puffs 2 times a day. 3 Each 3     No current Epic-ordered facility-administered medications on file.       Past Medical History:   Diagnosis Date    Asthma        No past surgical history on file.    Social History     Tobacco Use    Smoking status: Former     Packs/day: 0.25     Years: 5.00     Pack years: 1.25     Types: Cigarettes     Quit date: 12/25/2019     Years since quitting: 3.2    Smokeless tobacco: Never   Vaping Use    Vaping Use: Never used   Substance Use Topics    Alcohol use: Yes     Comment: occ    Drug use: Not Currently     Types: Marijuana       Social History     Social History Narrative    Not on file       No family history on file.    ROS:     - Constitutional: Negative for fever, chills, unexpected weight change, and fatigue/generalized weakness.     - HEENT: Negative for headaches, vision changes, hearing changes, ear pain, ear discharge, rhinorrhea, sinus congestion, sore throat, and neck pain.      - Respiratory: Negative for cough, sputum production, chest congestion, dyspnea, wheezing, and crackles.      - Cardiovascular: Negative for chest pain, palpitations, orthopnea, and bilateral lower extremity edema.      "- Gastrointestinal: Positive for constipation. Negative for heartburn, nausea, vomiting, abdominal pain, hematochezia, melena, diarrhea,  and greasy/foul-smelling stools.     - Genitourinary: Negative for dysuria, polyuria, hematuria, pyuria, urinary urgency, and urinary incontinence.     - Musculoskeletal: Positive for lower back pain. Negative for myalgias,  and joint pain.     - Skin: Negative for rash, itching, cyanotic skin color change.     - Neurological: Negative for dizziness, tingling, tremors, focal sensory deficit, focal weakness and headaches.     - Endo/Heme/Allergies: Does not bruise/bleed easily.     - Psychiatric/Behavioral: Positive for anxiety. Negative for depression, suicidal/homicidal ideation and memory loss.            Health Maintenance  Cervical CA screening: upcoming  Fasting glucose: upcoming  Diet: good   Fasting Lipids: upcoming      Exam: /64   Pulse (!) 106   Temp 36.8 °C (98.2 °F) (Temporal)   Resp 16   Ht 1.727 m (5' 8\")   Wt 108 kg (239 lb)   SpO2 98%  Body mass index is 36.34 kg/m².    General: Normal appearing. No acute distress.  Skin: Warm and dry.  No obvious lesions.  HEENT: Normocephalic. Eyes conjunctiva clear lids without ptosis, ears normal shape and contour  Cardiovascular: Regular rate and rhythm without murmur.   Respiratory: Clear to auscultation bilaterally, no rhonchi wheezing or rales.  Neurologic: Grossly nonfocal, A&O x3, gait normal,  Musculoskeletal: No deformity or swelling.   Extremities: No extremity cyanosis, clubbing, or edema.  Psych: Normal mood and affect. Judgment and insight is normal.    Please note that this dictation was created using voice recognition software. I have made every reasonable attempt to correct obvious errors, but I expect that there are errors of grammar and possibly content that I did not discover before finalizing the note.      Assessment/Plan  1. Physical exam, annual  PE conducted. Will schedule PAP smear in " upcoming weeks.  - HEMOGLOBIN A1C; Future  - Lipid Profile; Future  - MICROALBUMIN CREAT RATIO URINE; Future  - TSH WITH REFLEX TO FT4; Future  - CBC WITH DIFFERENTIAL; Future  - Comp Metabolic Panel; Future    2. Mild intermittent asthma without complication  Well controlled with symbicort.   - budesonide-formoterol (SYMBICORT) 160-4.5 MCG/ACT Aerosol; Inhale 2 Puffs 2 times a day.  Dispense: 3 Each; Refill: 0    3. Sore throat  Negative for strep  - POCT Rapid Strep A

## 2023-03-28 DIAGNOSIS — J45.20 MILD INTERMITTENT ASTHMA WITHOUT COMPLICATION: ICD-10-CM

## 2023-03-29 RX ORDER — BUDESONIDE AND FORMOTEROL FUMARATE DIHYDRATE 160; 4.5 UG/1; UG/1
2 AEROSOL RESPIRATORY (INHALATION) 2 TIMES DAILY
Qty: 3 EACH | Refills: 0 | Status: SHIPPED | OUTPATIENT
Start: 2023-03-29 | End: 2023-11-10 | Stop reason: SDUPTHER

## 2023-11-10 ENCOUNTER — OFFICE VISIT (OUTPATIENT)
Dept: MEDICAL GROUP | Facility: PHYSICIAN GROUP | Age: 33
End: 2023-11-10
Payer: COMMERCIAL

## 2023-11-10 VITALS
WEIGHT: 235 LBS | HEIGHT: 68 IN | BODY MASS INDEX: 35.61 KG/M2 | HEART RATE: 88 BPM | TEMPERATURE: 97.8 F | SYSTOLIC BLOOD PRESSURE: 122 MMHG | DIASTOLIC BLOOD PRESSURE: 60 MMHG | RESPIRATION RATE: 12 BRPM | OXYGEN SATURATION: 99 %

## 2023-11-10 DIAGNOSIS — Z11.59 ENCOUNTER FOR HEPATITIS C SCREENING TEST FOR LOW RISK PATIENT: ICD-10-CM

## 2023-11-10 DIAGNOSIS — R73.03 PREDIABETES: ICD-10-CM

## 2023-11-10 DIAGNOSIS — Z00.00 PHYSICAL EXAM, ANNUAL: ICD-10-CM

## 2023-11-10 DIAGNOSIS — Z11.3 SCREEN FOR STD (SEXUALLY TRANSMITTED DISEASE): ICD-10-CM

## 2023-11-10 DIAGNOSIS — J45.20 MILD INTERMITTENT ASTHMA WITHOUT COMPLICATION: ICD-10-CM

## 2023-11-10 PROCEDURE — 99214 OFFICE O/P EST MOD 30 MIN: CPT | Performed by: PHYSICIAN ASSISTANT

## 2023-11-10 PROCEDURE — 3074F SYST BP LT 130 MM HG: CPT | Performed by: PHYSICIAN ASSISTANT

## 2023-11-10 PROCEDURE — 3078F DIAST BP <80 MM HG: CPT | Performed by: PHYSICIAN ASSISTANT

## 2023-11-10 RX ORDER — BUDESONIDE AND FORMOTEROL FUMARATE DIHYDRATE 160; 4.5 UG/1; UG/1
2 AEROSOL RESPIRATORY (INHALATION) 2 TIMES DAILY
Qty: 3 EACH | Refills: 3 | Status: SHIPPED | OUTPATIENT
Start: 2023-11-10

## 2023-11-10 NOTE — PROGRESS NOTES
"CC:  Chief Complaint   Patient presents with    Lab Results    Medication Refill     SYMBICORT       HISTORY OF PRESENT ILLNESS: Patient is a 33 y.o. female established patient presenting with issues below  Patient has been well controlled Symbicort.  Her hemoglobin A1c at 5.9%.  Admits that she likes to eat candy and add sugar to her coffee.    Current Outpatient Medications   Medication Sig Dispense Refill    budesonide-formoterol (SYMBICORT) 160-4.5 MCG/ACT Aerosol Inhale 2 Puffs 2 times a day. 3 Each 0     No current facility-administered medications for this visit.        ROS:     ROS    Exam:    /60   Pulse 88   Temp 36.6 °C (97.8 °F) (Temporal)   Resp 12   Ht 1.727 m (5' 8\")   Wt 107 kg (235 lb)   SpO2 99%  Body mass index is 35.73 kg/m².    General:  Well nourished, well developed female in NAD  Head is grossly normal.  Neck: Supple.   Pulmonary: Clear to auscultation. No ronchi, wheezing or rales  Cardiac: Regular rate and rhythm. No murmurs.  Skin: Warm and dry. No obvious lesions  Neuro: Normal muscle tone. Gait normal. Alert and oriented.  Psych: Normal mood and affect      Please note that this dictation was created using voice recognition software. I have made every reasonable attempt to correct obvious errors, but I expect that there are errors of grammar and possibly content that I did not discover before finalizing the note.        Assessment/Plan:    1. Mild intermittent asthma without complication  Well-controlled.  Refill of Symbicort 160/4.5 mcg sent in  - budesonide-formoterol (SYMBICORT) 160-4.5 MCG/ACT Aerosol; Inhale 2 Puffs 2 times a day.  Dispense: 3 Each; Refill: 3    2. Physical exam, annual  Labs printed  - CBC WITH DIFFERENTIAL; Future  - Comp Metabolic Panel; Future  - HEMOGLOBIN A1C; Future  - Lipid Profile; Future  - TSH WITH REFLEX TO FT4; Future    3. Screen for STD (sexually transmitted disease)    - HIV AG/AB COMBO ASSAY SCREENING; Future    4. Encounter for " hepatitis C screening test for low risk patient    - HEP C VIRUS ANTIBODY; Future    5. Prediabetes  Discussed sugar reduction and weight loss.

## 2024-11-10 DIAGNOSIS — J45.20 MILD INTERMITTENT ASTHMA WITHOUT COMPLICATION: ICD-10-CM

## 2024-11-11 NOTE — TELEPHONE ENCOUNTER
Received request via: Pharmacy    Was the patient seen in the last year in this department? No    Does the patient have an active prescription (recently filled or refills available) for medication(s) requested? No    [facility]     Does the patient have [facility] living [facility] and need 100-day supply? (This applies to ALL medications) Patient does not have SCP

## 2024-11-12 RX ORDER — BUDESONIDE AND FORMOTEROL FUMARATE DIHYDRATE 160; 4.5 UG/1; UG/1
AEROSOL RESPIRATORY (INHALATION)
Qty: 30.6 G | Refills: 3 | Status: SHIPPED | OUTPATIENT
Start: 2024-11-12

## 2025-05-06 ENCOUNTER — OFFICE VISIT (OUTPATIENT)
Dept: MEDICAL GROUP | Facility: PHYSICIAN GROUP | Age: 35
End: 2025-05-06
Payer: COMMERCIAL

## 2025-05-06 VITALS
BODY MASS INDEX: 36.53 KG/M2 | HEART RATE: 113 BPM | DIASTOLIC BLOOD PRESSURE: 82 MMHG | OXYGEN SATURATION: 98 % | SYSTOLIC BLOOD PRESSURE: 136 MMHG | TEMPERATURE: 99.9 F | HEIGHT: 68 IN | RESPIRATION RATE: 16 BRPM | WEIGHT: 241 LBS

## 2025-05-06 DIAGNOSIS — R20.2 NUMBNESS AND TINGLING: ICD-10-CM

## 2025-05-06 DIAGNOSIS — Z11.3 SCREEN FOR STD (SEXUALLY TRANSMITTED DISEASE): ICD-10-CM

## 2025-05-06 DIAGNOSIS — Z00.00 PHYSICAL EXAM, ANNUAL: ICD-10-CM

## 2025-05-06 DIAGNOSIS — R20.0 NUMBNESS AND TINGLING: ICD-10-CM

## 2025-05-06 DIAGNOSIS — Z11.59 ENCOUNTER FOR HEPATITIS C SCREENING TEST FOR LOW RISK PATIENT: ICD-10-CM

## 2025-05-06 PROCEDURE — 3075F SYST BP GE 130 - 139MM HG: CPT | Performed by: PHYSICIAN ASSISTANT

## 2025-05-06 PROCEDURE — 99214 OFFICE O/P EST MOD 30 MIN: CPT | Performed by: PHYSICIAN ASSISTANT

## 2025-05-06 PROCEDURE — 3079F DIAST BP 80-89 MM HG: CPT | Performed by: PHYSICIAN ASSISTANT

## 2025-05-06 NOTE — PROGRESS NOTES
"CC:  Chief Complaint   Patient presents with    Tingling     Bilateral arms and legs x2wks        HISTORY OF PRESENT ILLNESS: Patient is a 34 y.o. female established patient presenting with issues below  Pt had a headache two weeks ago that lasted two days and then afterwards has been having tinlging in her bilateral legs and arms. Also having tingling in her face on both sides. Admits to muscular weakness in her legs particularly notable after eating. Feels like her lower jaw is very sluggish.   Having TMJ problems that have worsened recently.     Current Outpatient Medications   Medication Sig Dispense Refill    budesonide-formoterol (SYMBICORT) 160-4.5 MCG/ACT Aerosol USE 2 INHALATIONS TWICE A DAY 30.6 g 3     No current facility-administered medications for this visit.        ROS:     ROS    Exam:    /82   Pulse (!) 113   Temp 37.7 °C (99.9 °F) (Temporal)   Resp 16   Ht 1.727 m (5' 8\")   Wt 109 kg (241 lb)   SpO2 98%  Body mass index is 36.64 kg/m².    General:  Well nourished, well developed female in NAD  Head is grossly normal.  Neck: Supple.   Skin: Warm and dry. No obvious lesions  Neuro: Normal muscle tone. Gait normal. Alert and oriented.  Psych: Normal mood and affect      Please note that this dictation was created using voice recognition software. I have made every reasonable attempt to correct obvious errors, but I expect that there are errors of grammar and possibly content that I did not discover before finalizing the note.        Assessment/Plan:    1. Numbness and tingling  Check labs and review when available.   - FOLATE; Future  - VITAMIN B12; Future    2. Physical exam, annual  Labs printed  - CBC WITH DIFFERENTIAL; Future  - Comp Metabolic Panel; Future  - HEMOGLOBIN A1C; Future  - Lipid Profile; Future  - TSH WITH REFLEX TO FT4; Future  - VITAMIN D,25 HYDROXY (DEFICIENCY); Future    3. Screen for STD (sexually transmitted disease)    - HIV AG/AB COMBO ASSAY SCREENING; Future    4. " Encounter for hepatitis C screening test for low risk patient    - HEP C VIRUS ANTIBODY; Future